# Patient Record
Sex: FEMALE | Race: WHITE | Employment: OTHER | URBAN - METROPOLITAN AREA
[De-identification: names, ages, dates, MRNs, and addresses within clinical notes are randomized per-mention and may not be internally consistent; named-entity substitution may affect disease eponyms.]

---

## 2017-01-20 ENCOUNTER — TELEPHONE (OUTPATIENT)
Dept: PULMONOLOGY | Age: 62
End: 2017-01-20

## 2017-02-21 ENCOUNTER — TELEPHONE (OUTPATIENT)
Dept: CARDIOLOGY CLINIC | Age: 62
End: 2017-02-21

## 2017-03-02 ENCOUNTER — TELEPHONE (OUTPATIENT)
Dept: CARDIOLOGY CLINIC | Age: 62
End: 2017-03-02

## 2017-03-09 ENCOUNTER — TELEPHONE (OUTPATIENT)
Dept: CARDIOLOGY CLINIC | Age: 62
End: 2017-03-09

## 2017-03-21 ENCOUNTER — TELEPHONE (OUTPATIENT)
Dept: CARDIOLOGY CLINIC | Age: 62
End: 2017-03-21

## 2017-03-22 ENCOUNTER — TELEPHONE (OUTPATIENT)
Dept: CARDIOLOGY CLINIC | Age: 62
End: 2017-03-22

## 2017-04-11 ENCOUNTER — TELEPHONE (OUTPATIENT)
Dept: CARDIOLOGY CLINIC | Age: 62
End: 2017-04-11

## 2017-11-29 ENCOUNTER — OFFICE VISIT (OUTPATIENT)
Dept: PULMONOLOGY | Age: 62
End: 2017-11-29

## 2017-11-29 VITALS — OXYGEN SATURATION: 97 % | DIASTOLIC BLOOD PRESSURE: 61 MMHG | HEART RATE: 50 BPM | SYSTOLIC BLOOD PRESSURE: 136 MMHG

## 2017-11-29 DIAGNOSIS — R05.3 CHRONIC COUGH: Primary | ICD-10-CM

## 2017-11-29 DIAGNOSIS — D86.9 SARCOID: ICD-10-CM

## 2017-11-29 DIAGNOSIS — R56.9 SEIZURE (HCC): ICD-10-CM

## 2017-11-29 PROCEDURE — G8427 DOCREV CUR MEDS BY ELIG CLIN: HCPCS | Performed by: INTERNAL MEDICINE

## 2017-11-29 PROCEDURE — 99204 OFFICE O/P NEW MOD 45 MIN: CPT | Performed by: INTERNAL MEDICINE

## 2017-11-29 PROCEDURE — G8484 FLU IMMUNIZE NO ADMIN: HCPCS | Performed by: INTERNAL MEDICINE

## 2017-11-29 PROCEDURE — 3017F COLORECTAL CA SCREEN DOC REV: CPT | Performed by: INTERNAL MEDICINE

## 2017-11-29 PROCEDURE — G8419 CALC BMI OUT NRM PARAM NOF/U: HCPCS | Performed by: INTERNAL MEDICINE

## 2017-11-29 PROCEDURE — 1036F TOBACCO NON-USER: CPT | Performed by: INTERNAL MEDICINE

## 2017-11-29 PROCEDURE — 3014F SCREEN MAMMO DOC REV: CPT | Performed by: INTERNAL MEDICINE

## 2017-11-29 RX ORDER — MECLIZINE HCL 12.5 MG/1
12.5 TABLET ORAL 3 TIMES DAILY PRN
COMMUNITY
End: 2017-12-22 | Stop reason: ALTCHOICE

## 2017-11-29 RX ORDER — FLUTICASONE PROPIONATE 220 UG/1
1 AEROSOL, METERED RESPIRATORY (INHALATION) 2 TIMES DAILY
COMMUNITY
Start: 2017-06-27

## 2017-11-29 RX ORDER — ALBUTEROL SULFATE 90 UG/1
2 AEROSOL, METERED RESPIRATORY (INHALATION) EVERY 6 HOURS PRN
Status: ON HOLD | COMMUNITY
End: 2018-07-15 | Stop reason: ALTCHOICE

## 2017-11-29 ASSESSMENT — ENCOUNTER SYMPTOMS
CONSTIPATION: 0
DIARRHEA: 0
SINUS PRESSURE: 0
NAUSEA: 0
ABDOMINAL PAIN: 0

## 2017-11-29 NOTE — PROGRESS NOTES
Subjective:      Patient ID: Mary Jane Adam is a 57 y.o. female. HPI  The patient presents with complaints of Sarcoidosis. She was diagnosed with \"neurosarcoid\" by Dr Cherry Rubi in 2002. She is fuzzy on the details. Review of Dr Ninoska Moser last note (2014) states: \"possible neurosarcoidosis based on the clinical features and an elevated ACE level. \"    Dr Cherry Rubi concludes: \"My impression is that Ms. Sarah Will does seem to have some autonomic problems and I think she very likely has small fiber neuropathy. She was going to see Dr. Carlee Dhillon but insurance issues became more _____ problematic. She also has difficulty arranging for transportation and someone to be with her for 2 or 3 days that she would have to have when she was being seen at the [facility]. She is on Cymbalta now. She did feel better when she was on the drug, worse when she came off. She has not been able to tolerate Neurontin or Lyrica. I thought we would see if we can add on low-dose amitriptyline at 10 mg at night. She will contact us if she has problems. I am very reluctant to start her on prednisone. I think her symptoms are more related to the small fiber neuropathy which usually does not respond to steroids. She has no specific inflammatory component at this time that requires specific therapy. I do think she is having problems with possible drug interactions. She is having memory problems. She has not been able to work now because of all her medications. She will follow up with her primary doctors regarding this. \"      She gets sick really easy and it takes a long time to get better. She had some hemoptysis in October. She has heart problems that she blames on Sarcoid as well. She has had a lot of trouble with cough (chronic). She has had seizures and \"stroke\". She was on Keppra for a time. She was in a nursing home for a time. Review of Systems   Constitutional: Negative for fatigue and fever.    HENT: Negative for congestion and sinus pressure. Eyes: Negative for visual disturbance. Cardiovascular: Negative for chest pain and palpitations. Gastrointestinal: Negative for abdominal pain, constipation, diarrhea and nausea. Some trouble with swallowing   Genitourinary: Negative for difficulty urinating. Musculoskeletal: Negative for arthralgias. Skin: Negative for rash. Neurological: Negative for dizziness and light-headedness. Hematological: Does not bruise/bleed easily. Psychiatric/Behavioral: Negative for behavioral problems. Objective:   Physical Exam   Constitutional: She appears well-developed and well-nourished. No distress. HENT:   Head: Normocephalic and atraumatic. Eyes:   Nasal mucosa, teeth and gums and oropharynx are clear. Neck: Neck supple. No JVD present. No tracheal deviation present. No thyromegaly (There are no other neck masses noted.) present. Cardiovascular: Normal rate, regular rhythm, normal heart sounds and intact distal pulses. No murmur heard. Pulmonary/Chest: Effort normal and breath sounds normal. No respiratory distress. She has no wheezes. She has no rales. She exhibits no tenderness. The chest is symmetric in. There is no dullness to percussion or tactile fremitus noted. Abdominal: Soft. Bowel sounds are normal. She exhibits no distension. Mass: There is no hepatosplenomegaly. There is no tenderness. Musculoskeletal: She exhibits no edema (There is no clubbingor cyanosis of the digits) or tenderness (Muscle strength is normal and there is no obvious atrophy). Lymphadenopathy:     She has no cervical adenopathy. She has no axillary adenopathy. Right: No inguinal adenopathy present. Left: No inguinal adenopathy present. Skin: Skin is warm and dry. No rash noted. Psychiatric: She has a normal mood and affect. Oriented to person place and time       Assessment:      1. Seizure ([address]     2. Chronic cough     3.  Sarcoid ([address]

## 2018-02-13 ENCOUNTER — TELEPHONE (OUTPATIENT)
Dept: INTERNAL MEDICINE CLINIC | Age: 63
End: 2018-02-13

## 2018-06-05 PROBLEM — I63.511 ACUTE RIGHT MCA STROKE (HCC): Status: ACTIVE | Noted: 2018-06-05

## 2018-06-06 PROBLEM — I48.0 PAF (PAROXYSMAL ATRIAL FIBRILLATION) (HCC): Status: ACTIVE | Noted: 2018-06-06

## 2018-06-09 PROBLEM — I48.0 PAF (PAROXYSMAL ATRIAL FIBRILLATION) (HCC): Status: RESOLVED | Noted: 2018-06-06 | Resolved: 2018-06-09

## 2018-06-09 PROBLEM — I63.511 ACUTE RIGHT MCA STROKE (HCC): Status: RESOLVED | Noted: 2018-06-05 | Resolved: 2018-06-09

## 2018-07-15 ENCOUNTER — HOSPITAL ENCOUNTER (INPATIENT)
Age: 63
LOS: 1 days | Discharge: HOME | DRG: 313 | End: 2018-07-16
Attending: EMERGENCY MEDICINE | Admitting: HOSPITALIST
Payer: MEDICARE

## 2018-07-15 ENCOUNTER — APPOINTMENT (OUTPATIENT)
Dept: GENERAL RADIOLOGY | Age: 63
DRG: 313 | End: 2018-07-15
Payer: MEDICARE

## 2018-07-15 DIAGNOSIS — R07.9 CHEST PAIN, UNSPECIFIED TYPE: Primary | ICD-10-CM

## 2018-07-15 LAB
BASOPHILS ABSOLUTE: 0 K/UL (ref 0–0.2)
BASOPHILS RELATIVE PERCENT: 0.8 %
BILIRUBIN URINE: NEGATIVE MG/DL
BLOOD, URINE: NEGATIVE
CALCIUM IONIZED: 1.13 MMOL/L (ref 1.12–1.32)
CLARITY: ABNORMAL
CO2: 24 MMOL/L (ref 21–32)
COLOR: ABNORMAL
EOSINOPHILS ABSOLUTE: 0.1 K/UL (ref 0–0.6)
EOSINOPHILS RELATIVE PERCENT: 1.2 %
EPITHELIAL CELLS, UA: NORMAL /HPF
GFR AFRICAN AMERICAN: >60
GFR NON-AFRICAN AMERICAN: >60
GLUCOSE BLD-MCNC: 95 MG/DL (ref 70–99)
GLUCOSE URINE: NEGATIVE MG/DL
HCT VFR BLD CALC: 40.6 % (ref 36–48)
HEMOGLOBIN: 13.7 G/DL (ref 12–16)
KETONES, URINE: NEGATIVE MG/DL
LEUKOCYTE ESTERASE, URINE: ABNORMAL
LYMPHOCYTES ABSOLUTE: 1.8 K/UL (ref 1–5.1)
LYMPHOCYTES RELATIVE PERCENT: 32.9 %
MCH RBC QN AUTO: 29.7 PG (ref 26–34)
MCHC RBC AUTO-ENTMCNC: 33.7 G/DL (ref 31–36)
MCV RBC AUTO: 88 FL (ref 80–100)
MICROSCOPIC EXAMINATION: YES
MONOCYTES ABSOLUTE: 0.4 K/UL (ref 0–1.3)
MONOCYTES RELATIVE PERCENT: 8.3 %
NEUTROPHILS ABSOLUTE: 3.1 K/UL (ref 1.7–7.7)
NEUTROPHILS RELATIVE PERCENT: 56.8 %
NITRITE, URINE: NEGATIVE
PDW BLD-RTO: 13.5 % (ref 12.4–15.4)
PERFORMED ON: ABNORMAL
PERFORMED ON: NORMAL
PH UA: 6.5
PLATELET # BLD: 240 K/UL (ref 135–450)
PMV BLD AUTO: 7.9 FL (ref 5–10.5)
POC ANION GAP: 13 (ref 10–20)
POC BUN: 17 MG/DL (ref 7–18)
POC CHLORIDE: 104 MMOL/L (ref 99–110)
POC CREATININE: 0.8 MG/DL (ref 0.6–1.2)
POC POTASSIUM: 3.9 MMOL/L (ref 3.5–5.1)
POC SAMPLE TYPE: ABNORMAL
POC SAMPLE TYPE: NORMAL
POC SODIUM: 141 MMOL/L (ref 136–145)
POC TROPONIN I: 0.11 NG/ML (ref 0–0.1)
PROTEIN UA: NEGATIVE MG/DL
RBC # BLD: 4.61 M/UL (ref 4–5.2)
RBC UA: NORMAL /HPF (ref 0–2)
SPECIFIC GRAVITY UA: 1.01
TROPONIN: 0.03 NG/ML
UROBILINOGEN, URINE: 0.2 E.U./DL
WBC # BLD: 5.4 K/UL (ref 4–11)
WBC UA: NORMAL /HPF (ref 0–5)

## 2018-07-15 PROCEDURE — G0378 HOSPITAL OBSERVATION PER HR: HCPCS

## 2018-07-15 PROCEDURE — 2060000000 HC ICU INTERMEDIATE R&B

## 2018-07-15 PROCEDURE — 85025 COMPLETE CBC W/AUTO DIFF WBC: CPT

## 2018-07-15 PROCEDURE — 84484 ASSAY OF TROPONIN QUANT: CPT

## 2018-07-15 PROCEDURE — 80047 BASIC METABLC PNL IONIZED CA: CPT

## 2018-07-15 PROCEDURE — 6370000000 HC RX 637 (ALT 250 FOR IP): Performed by: EMERGENCY MEDICINE

## 2018-07-15 PROCEDURE — 93005 ELECTROCARDIOGRAM TRACING: CPT

## 2018-07-15 PROCEDURE — 81001 URINALYSIS AUTO W/SCOPE: CPT

## 2018-07-15 PROCEDURE — 71045 X-RAY EXAM CHEST 1 VIEW: CPT

## 2018-07-15 PROCEDURE — 99285 EMERGENCY DEPT VISIT HI MDM: CPT

## 2018-07-15 RX ORDER — LEVOTHYROXINE SODIUM 0.07 MG/1
75 TABLET ORAL DAILY
Status: DISCONTINUED | OUTPATIENT
Start: 2018-07-16 | End: 2018-07-16 | Stop reason: HOSPADM

## 2018-07-15 RX ORDER — SODIUM CHLORIDE 0.9 % (FLUSH) 0.9 %
10 SYRINGE (ML) INJECTION EVERY 12 HOURS SCHEDULED
Status: DISCONTINUED | OUTPATIENT
Start: 2018-07-15 | End: 2018-07-16 | Stop reason: HOSPADM

## 2018-07-15 RX ORDER — ALBUTEROL SULFATE 90 UG/1
2 AEROSOL, METERED RESPIRATORY (INHALATION) EVERY 6 HOURS PRN
Status: DISCONTINUED | OUTPATIENT
Start: 2018-07-15 | End: 2018-07-16 | Stop reason: HOSPADM

## 2018-07-15 RX ORDER — FLUTICASONE PROPIONATE 50 MCG
1 SPRAY, SUSPENSION (ML) NASAL DAILY
Status: DISCONTINUED | OUTPATIENT
Start: 2018-07-16 | End: 2018-07-16 | Stop reason: HOSPADM

## 2018-07-15 RX ORDER — SODIUM CHLORIDE 0.9 % (FLUSH) 0.9 %
10 SYRINGE (ML) INJECTION PRN
Status: DISCONTINUED | OUTPATIENT
Start: 2018-07-15 | End: 2018-07-16 | Stop reason: HOSPADM

## 2018-07-15 RX ORDER — ASPIRIN 81 MG/1
81 TABLET ORAL DAILY
Status: DISCONTINUED | OUTPATIENT
Start: 2018-07-16 | End: 2018-07-16 | Stop reason: HOSPADM

## 2018-07-15 RX ORDER — ASPIRIN 81 MG/1
324 TABLET, CHEWABLE ORAL ONCE
Status: DISCONTINUED | OUTPATIENT
Start: 2018-07-15 | End: 2018-07-15

## 2018-07-15 RX ORDER — DIAZEPAM 5 MG/1
5 TABLET ORAL EVERY 12 HOURS PRN
Status: DISCONTINUED | OUTPATIENT
Start: 2018-07-15 | End: 2018-07-16 | Stop reason: HOSPADM

## 2018-07-15 RX ORDER — ESCITALOPRAM OXALATE 10 MG/1
5 TABLET ORAL NIGHTLY
Status: DISCONTINUED | OUTPATIENT
Start: 2018-07-15 | End: 2018-07-16 | Stop reason: HOSPADM

## 2018-07-15 RX ORDER — PANTOPRAZOLE SODIUM 20 MG/1
20 TABLET, DELAYED RELEASE ORAL 2 TIMES DAILY
Status: DISCONTINUED | OUTPATIENT
Start: 2018-07-15 | End: 2018-07-16 | Stop reason: HOSPADM

## 2018-07-15 RX ORDER — UBIDECARENONE 75 MG
50 CAPSULE ORAL DAILY
Status: DISCONTINUED | OUTPATIENT
Start: 2018-07-16 | End: 2018-07-16 | Stop reason: HOSPADM

## 2018-07-15 RX ORDER — ONDANSETRON 2 MG/ML
4 INJECTION INTRAMUSCULAR; INTRAVENOUS EVERY 6 HOURS PRN
Status: DISCONTINUED | OUTPATIENT
Start: 2018-07-15 | End: 2018-07-16 | Stop reason: HOSPADM

## 2018-07-15 RX ORDER — ASPIRIN 325 MG
325 TABLET ORAL ONCE
Status: COMPLETED | OUTPATIENT
Start: 2018-07-15 | End: 2018-07-15

## 2018-07-15 RX ORDER — ACETAMINOPHEN 325 MG/1
650 TABLET ORAL EVERY 4 HOURS PRN
Status: DISCONTINUED | OUTPATIENT
Start: 2018-07-15 | End: 2018-07-16 | Stop reason: HOSPADM

## 2018-07-15 RX ADMIN — ASPIRIN 325 MG ORAL TABLET 325 MG: 325 PILL ORAL at 18:09

## 2018-07-15 ASSESSMENT — PAIN DESCRIPTION - PAIN TYPE
TYPE: ACUTE PAIN
TYPE: ACUTE PAIN

## 2018-07-15 ASSESSMENT — PAIN SCALES - GENERAL
PAINLEVEL_OUTOF10: 5
PAINLEVEL_OUTOF10: 4

## 2018-07-15 ASSESSMENT — PAIN DESCRIPTION - LOCATION
LOCATION: HEAD
LOCATION: HIP;LEG

## 2018-07-15 ASSESSMENT — PAIN DESCRIPTION - FREQUENCY: FREQUENCY: CONTINUOUS

## 2018-07-15 ASSESSMENT — PAIN DESCRIPTION - ONSET: ONSET: PROGRESSIVE

## 2018-07-15 ASSESSMENT — PAIN DESCRIPTION - DESCRIPTORS: DESCRIPTORS: HEADACHE

## 2018-07-15 ASSESSMENT — PAIN DESCRIPTION - PROGRESSION: CLINICAL_PROGRESSION: NOT CHANGED

## 2018-07-15 ASSESSMENT — PAIN DESCRIPTION - ORIENTATION
ORIENTATION: RIGHT;LEFT
ORIENTATION: MID

## 2018-07-15 NOTE — ED PROVIDER NOTES
[facility] RESIDENT NOTE     Date of evaluation: 7/15/2018    ADDENDUM:      Care of this patient was assumed from Dr. Marcelle Swenson. The patient was seen for Chest Pain (Pt walked to fire department d/t chest pain and felt like her a-fib was acting up. When she arrived to the fire department she had three witnessed seizures. Pt has hx seizure but no on medication. ) and Seizures  . The patient's initial evaluation and plan have been discussed with the prior provider who initially evaluated the patient. Nursing Notes, Past Medical Hx, Past Surgical Hx, Social Hx, Allergies, and Family Hx were all reviewed. Diagnostic Results       RADIOLOGY:  XR CHEST PORTABLE   Final Result      No acute pulmonary disease.           LABS:   Results for orders placed or performed during the hospital encounter of 07/15/18   CBC auto differential   Result Value Ref Range    WBC 5.4 4.0 - 11.0 K/uL    RBC 4.61 4.00 - 5.20 M/uL    Hemoglobin 13.7 12.0 - 16.0 g/dL    Hematocrit 40.6 36.0 - 48.0 %    MCV 88.0 80.0 - 100.0 fL    MCH 29.7 26.0 - 34.0 pg    MCHC 33.7 31.0 - 36.0 g/dL    RDW 13.5 12.4 - 15.4 %    Platelets 773 508 - 193 K/uL    MPV 7.9 5.0 - 10.5 fL    Neutrophils % 56.8 %    Lymphocytes % 32.9 %    Monocytes % 8.3 %    Eosinophils % 1.2 %    Basophils % 0.8 %    Neutrophils # 3.1 1.7 - 7.7 K/uL    Lymphocytes # 1.8 1.0 - 5.1 K/uL    Monocytes # 0.4 0.0 - 1.3 K/uL    Eosinophils # 0.1 0.0 - 0.6 K/uL    Basophils # 0.0 0.0 - 0.2 K/uL   Troponin   Result Value Ref Range    Troponin 0.03 (H) <0.01 ng/mL   Microscopic Urinalysis   Result Value Ref Range    WBC, UA 0-2 0 - 5 /HPF    RBC, UA 0-2 0 - 2 /HPF    Epi Cells 0-2 /HPF   POCT Venous   Result Value Ref Range    POC Sodium 141 136 - 145 mmol/L    POC Potassium 3.9 3.5 - 5.1 mmol/L    POC Chloride 104 99 - 110 mmol/L    CO2 24 21 - 32 mmol/L    POC Anion Gap 13 10 - 20    POC Glucose 95 70 - 99 mg/dl    POC BUN 17 7 - 18 mg/dL 1. Chest pain, unspecified type        Disposition     PATIENT REFERRED TO:  Coni [address]  [phone]            DISCHARGE MEDICATIONS:  New Prescriptions    No medications on file       DISPOSITION Admitted 07/15/2018 07:52:59 PM        Anayeli Sheldon MD  Resident  07/15/18 2007

## 2018-07-15 NOTE — ED PROVIDER NOTES
Date of evaluation: 7/15/2018    Chief Complaint   Chest Pain (Pt walked to fire department d/t chest pain and felt like her a-fib was acting up. When she arrived to the fire department she had three witnessed seizures. Pt has hx seizure but no on medication. ) and Seizures    Nursing Notes, Past Medical Hx, Past Surgical Hx, Social Hx, Allergies, and Family Hx were reviewed. History of Present Illness     Kingston Cuellar is a 70 y.o. female with a history as noted below presenting with chest pain and seizures. The patient has a recent hospitalization last month due to acute left-sided weakness, she tells me that her doctor during that admission, Dr. Bk Posadas, told her she had a \"devastating stroke. \"  Reading his discharge summary from that hospital stay, she had an extensive evaluation including CT, MRI, MRA, lumbar puncture, all were within normal limits, they could not explain her symptoms, she had no evidence of atrial fibrillation, this was investigated, no history of this was ever found, psychiatry was consulted, they believe she has conversion disorder. She presented to emergency department today via EMS. History is obtained from her as well as from EMS. She tells me that yesterday, she felt very fatigued and globally weak. This was worsened today and she began feeling today some \"irregular beats\" that was her atrial fibrillation and each irregular beat was causing her chest pain. She therefore ambulated to the fire department where she reportedly had 3 seizures. She was treated with 10 mg of intramuscular Versed. She was subsequently brought here. On arrival, no postictal state. She has mild chest pain at this time. No difficulty breathing. Denies any recent illness. No fevers, pain with urination, abdominal pain, nausea, vomiting, diarrhea. With the exception of the above, there are no further alleviating or exacerbating factors, and no other associated symptoms.       Review of Systems The patient reports Chest pain, irregular heartbeat, generalized fatigue as above. Denies fevers, chills, headache, neck pain, difficulty breathing, abdominal pain, vomiting, diarrhea, rectal bleeding, dysuria. All other review of systems reviewed and were negative except as mentioned in HPI. Past Medical, Surgical, Family, and Social History         Diagnosis Date    Arrhythmia     Asthma     Atrial fibrillation ([address]     CAD (coronary artery disease)     Gastroparesis     Hypertension     PAF (paroxysmal atrial fibrillation) ([address] 6/6/2018    Patient reported; no concrete evidence    Psychiatric disorder     Seizures ([address]     Sick sinus syndrome ([address]     T[facility]-jazmin syndrome [facility]     Unspecified cerebral artery occlusion with cerebral infarction          Procedure Laterality Date    APPENDECTOMY      BREAST SURGERY      HYSTERECTOMY       Her family history includes Alzheimer's Disease in her mother; Diabetes in her father and mother; High Blood Pressure in her mother. She reports that she has never smoked. She has never used smokeless tobacco. She reports that she does not drink alcohol or use drugs.     Medications     Previous Medications    ALBUTEROL SULFATE  (90 BASE) MCG/ACT INHALER    Inhale 2 puffs into the lungs every 6 hours as needed    ASPIRIN EC 81 MG EC TABLET    Take 81 mg by mouth daily    CYANOCOBALAMIN (B-12 PO)    Take 1 tablet by mouth daily    DIAZEPAM (VALIUM) 5 MG TABLET    Take 5 mg by mouth every 12 hours as needed     ESCITALOPRAM (LEXAPRO) 10 MG TABLET    Take 5 mg by mouth nightly     FLUTICASONE (FLONASE) 50 MCG/ACT NASAL SPRAY    1 spray by Nasal route daily    FLUTICASONE (FLOVENT HFA) 220 MCG/ACT INHALER    Inhale 1 puff into the lungs 2 times daily    LEVOTHYROXINE (SYNTHROID) 75 MCG TABLET    Take 75 mcg by mouth Daily    PANTOPRAZOLE (PROTONIX) 20 MG TABLET    Take 1 tablet by mouth 2 times daily    VITAMIN B-12 (CYANOCOBALAMIN) 100 MCG TABLET encounter. CONSULTS:  None    PATIENT REFERRED TO:  No follow-up provider specified.     DISCHARGE MEDICATIONS:  New Prescriptions    No medications on file        Rajendra Lee MD  Resident  07/19/18 [id number]

## 2018-07-16 VITALS
DIASTOLIC BLOOD PRESSURE: 70 MMHG | RESPIRATION RATE: 16 BRPM | WEIGHT: 167.33 LBS | TEMPERATURE: 97.8 F | BODY MASS INDEX: 27.88 KG/M2 | HEIGHT: 65 IN | OXYGEN SATURATION: 98 % | SYSTOLIC BLOOD PRESSURE: 112 MMHG | HEART RATE: 48 BPM

## 2018-07-16 PROBLEM — R56.9 SEIZURE-LIKE ACTIVITY (HCC): Status: ACTIVE | Noted: 2018-07-16

## 2018-07-16 PROBLEM — R77.8 ELEVATED TROPONIN I LEVEL: Status: ACTIVE | Noted: 2018-07-16

## 2018-07-16 PROBLEM — R79.89 ELEVATED TROPONIN I LEVEL: Status: ACTIVE | Noted: 2018-07-16

## 2018-07-16 LAB
ANION GAP SERPL CALCULATED.3IONS-SCNC: 14 MMOL/L (ref 3–16)
BUN BLDV-MCNC: 15 MG/DL (ref 7–20)
CALCIUM SERPL-MCNC: 9 MG/DL (ref 8.3–10.6)
CHLORIDE BLD-SCNC: 106 MMOL/L (ref 99–110)
CHOLESTEROL, TOTAL: 179 MG/DL (ref 0–199)
CO2: 23 MMOL/L (ref 21–32)
CREAT SERPL-MCNC: 0.8 MG/DL (ref 0.6–1.2)
GFR AFRICAN AMERICAN: >60
GFR NON-AFRICAN AMERICAN: >60
GLUCOSE BLD-MCNC: 106 MG/DL (ref 70–99)
HCT VFR BLD CALC: 40.2 % (ref 36–48)
HDLC SERPL-MCNC: 32 MG/DL (ref 40–60)
HEMOGLOBIN: 13.3 G/DL (ref 12–16)
LDL CHOLESTEROL CALCULATED: 113 MG/DL
MAGNESIUM: 2.1 MG/DL (ref 1.8–2.4)
MCH RBC QN AUTO: 29.6 PG (ref 26–34)
MCHC RBC AUTO-ENTMCNC: 33.2 G/DL (ref 31–36)
MCV RBC AUTO: 89.1 FL (ref 80–100)
PDW BLD-RTO: 13.4 % (ref 12.4–15.4)
PLATELET # BLD: 236 K/UL (ref 135–450)
PMV BLD AUTO: 8.2 FL (ref 5–10.5)
POTASSIUM REFLEX MAGNESIUM: 4.3 MMOL/L (ref 3.5–5.1)
RBC # BLD: 4.51 M/UL (ref 4–5.2)
SODIUM BLD-SCNC: 143 MMOL/L (ref 136–145)
TRIGL SERPL-MCNC: 172 MG/DL (ref 0–150)
TROPONIN: 0.02 NG/ML
TROPONIN: 0.02 NG/ML
TSH REFLEX: 1.21 UIU/ML (ref 0.27–4.2)
VLDLC SERPL CALC-MCNC: 34 MG/DL
WBC # BLD: 6.1 K/UL (ref 4–11)

## 2018-07-16 PROCEDURE — 6360000002 HC RX W HCPCS: Performed by: HOSPITALIST

## 2018-07-16 PROCEDURE — 2580000003 HC RX 258: Performed by: HOSPITALIST

## 2018-07-16 PROCEDURE — 36415 COLL VENOUS BLD VENIPUNCTURE: CPT

## 2018-07-16 PROCEDURE — 84443 ASSAY THYROID STIM HORMONE: CPT

## 2018-07-16 PROCEDURE — 94664 DEMO&/EVAL PT USE INHALER: CPT

## 2018-07-16 PROCEDURE — 94761 N-INVAS EAR/PLS OXIMETRY MLT: CPT

## 2018-07-16 PROCEDURE — 80048 BASIC METABOLIC PNL TOTAL CA: CPT

## 2018-07-16 PROCEDURE — 80061 LIPID PANEL: CPT

## 2018-07-16 PROCEDURE — G0378 HOSPITAL OBSERVATION PER HR: HCPCS

## 2018-07-16 PROCEDURE — 6370000000 HC RX 637 (ALT 250 FOR IP): Performed by: HOSPITALIST

## 2018-07-16 PROCEDURE — 83735 ASSAY OF MAGNESIUM: CPT

## 2018-07-16 PROCEDURE — 96372 THER/PROPH/DIAG INJ SC/IM: CPT

## 2018-07-16 PROCEDURE — 85027 COMPLETE CBC AUTOMATED: CPT

## 2018-07-16 PROCEDURE — 94760 N-INVAS EAR/PLS OXIMETRY 1: CPT

## 2018-07-16 PROCEDURE — 84484 ASSAY OF TROPONIN QUANT: CPT

## 2018-07-16 PROCEDURE — 94640 AIRWAY INHALATION TREATMENT: CPT

## 2018-07-16 RX ORDER — ACETAMINOPHEN 325 MG/1
650 TABLET ORAL EVERY 6 HOURS PRN
Qty: 120 TABLET | Refills: 3 | COMMUNITY
Start: 2018-07-16

## 2018-07-16 RX ADMIN — VITAMIN D, TAB 1000IU (100/BT) 1000 UNITS: 25 TAB at 07:52

## 2018-07-16 RX ADMIN — ASPIRIN 81 MG: 81 TABLET, COATED ORAL at 07:52

## 2018-07-16 RX ADMIN — Medication 2 PUFF: at 09:20

## 2018-07-16 RX ADMIN — LEVOTHYROXINE SODIUM 75 MCG: 75 TABLET ORAL at 07:52

## 2018-07-16 RX ADMIN — PANTOPRAZOLE SODIUM 20 MG: 20 TABLET, DELAYED RELEASE ORAL at 07:53

## 2018-07-16 RX ADMIN — Medication 10 ML: at 07:54

## 2018-07-16 RX ADMIN — VITAM B12 50 MCG: 100 TAB at 07:52

## 2018-07-16 RX ADMIN — ESCITALOPRAM OXALATE 5 MG: 10 TABLET ORAL at 00:58

## 2018-07-16 RX ADMIN — FLUTICASONE PROPIONATE 1 SPRAY: 50 SPRAY, METERED NASAL at 11:20

## 2018-07-16 RX ADMIN — DIAZEPAM 5 MG: 5 TABLET ORAL at 12:45

## 2018-07-16 RX ADMIN — ACETAMINOPHEN 650 MG: 325 TABLET ORAL at 00:59

## 2018-07-16 RX ADMIN — DIAZEPAM 5 MG: 5 TABLET ORAL at 03:40

## 2018-07-16 RX ADMIN — ENOXAPARIN SODIUM 40 MG: 100 INJECTION SUBCUTANEOUS at 07:52

## 2018-07-16 RX ADMIN — PANTOPRAZOLE SODIUM 20 MG: 20 TABLET, DELAYED RELEASE ORAL at 00:59

## 2018-07-16 ASSESSMENT — PAIN SCALES - GENERAL
PAINLEVEL_OUTOF10: 0
PAINLEVEL_OUTOF10: 0
PAINLEVEL_OUTOF10: 4
PAINLEVEL_OUTOF10: 0
PAINLEVEL_OUTOF10: 0

## 2018-07-16 ASSESSMENT — PAIN DESCRIPTION - ONSET: ONSET: ON-GOING

## 2018-07-16 ASSESSMENT — PAIN DESCRIPTION - DESCRIPTORS: DESCRIPTORS: HEADACHE

## 2018-07-16 ASSESSMENT — PAIN DESCRIPTION - LOCATION: LOCATION: HEAD

## 2018-07-16 ASSESSMENT — PAIN DESCRIPTION - PAIN TYPE: TYPE: ACUTE PAIN

## 2018-07-16 ASSESSMENT — PAIN DESCRIPTION - PROGRESSION
CLINICAL_PROGRESSION: NOT CHANGED
CLINICAL_PROGRESSION: NOT CHANGED

## 2018-07-16 ASSESSMENT — PAIN DESCRIPTION - FREQUENCY: FREQUENCY: CONTINUOUS

## 2018-07-16 ASSESSMENT — PAIN DESCRIPTION - ORIENTATION: ORIENTATION: MID

## 2018-07-16 NOTE — CONSULTS
--   143   K   --   4.3   CL   --   106   CO2  24  23   BUN   --   15   CREATININE  0.8  0.8     Mag:   Lab Results   Component Value Date    MG 2.10 07/16/2018     CARDIAC ENZYMES:   Recent Labs      07/15/18   1851  07/16/18   0011  07/16/18   0259   TROPONINI  0.03*  0.02*  0.02*     CXR: Normal      2014 LHC:  Cardiac Angiography: 2014 - Dr. LYN[facility]  Additional comments:   Left ventriculography and Hemodynamics   LVEDP 5   Estimated Ejection Fraction   Wall motion 65% with normal regional wall motion   Valve function No gradient across the Av and no MR   Coronary angiography   Dominance RCA   Left Main 0%   Left Anterior Descending 0% in the proximal and mid and distal segment   Left Circumflex 0% stenosis in the proximal and mid and distal segment , two small OM normal   Right Proximal , mid and distal And 0% PDA and PLV stenosis      6/2018 Echo: EF normal. Mod TR with rvsp of 44. No shunt on bubble study    ________________________    Epic chart reviewed. Patient has been discharged after apparently refusing any workup. I went to see the patient (~1245) and she was getting changed. RN informed me of her discharge. Please call me back if she is agreeable to an exam/evaluation. Liset Palomo MD, [facility]

## 2018-07-16 NOTE — H&P
Hospital Medicine History & Physical      PCP: Agata Suárez    Date of Admission: 7/15/2018    Date of Service: Pt seen/examined on 7/15/18 and Admitted to Inpatient with expected LOS greater than two midnights due to medical therapy    Chief Complaint:  Chest discomofrt    History Of Present Illness:    70 y.o. female who presented to [facility] sec to onset of chest discomfort. Was well until earlier today when noticed onset of feeling of palpitations mid chest, did not go away. She thus drove to the local fire dept to get checked out. While there, states she had a seizure, awoke in ambulance and brought to [facility] ED. Currently denies palpitations, CP. Extensive hx of CP, seizures, bradycardia of unclear etiology. Has seen multiple physicians in the area for this. Had EP study in past which was neg. Appears 3 EP physicians have declined the idea of pacemaker placement. Had L and R heart cath approx 4 yrs ago per pt, completely normal coronaries per pt. She holds a possible dx of neurosarcoidosis and seizure disorder. She was admitted here in June 18' for possible stroke, had extensive workup which was neg, psych felt possible conversion disorder. Past Medical History:          Diagnosis Date    Arrhythmia     Asthma     Atrial fibrillation ([address]     CAD (coronary artery disease)     Gastroparesis     Hypertension     PAF (paroxysmal atrial fibrillation) ([address] 6/6/2018    Patient reported; no concrete evidence    Psychiatric disorder     Seizures ([address]     Sick sinus syndrome ([address]     Tachy-jazmin syndrome ([address]     Unspecified cerebral artery occlusion with cerebral infarction        Past Surgical History:          Procedure Laterality Date    APPENDECTOMY      BREAST SURGERY      HYSTERECTOMY         Medications Prior to Admission:      Prior to Admission medications    Medication Sig Start Date End Date Taking?  Authorizing Provider   fluticasone (FLONASE) 50 MCG/ACT nasal 97.1 °F (36.2 °C) (Oral)   Resp 16   Ht 5' 5\" (1.651 m)   Wt 167 lb 5.3 oz (75.9 kg)   SpO2 95%   BMI 27.85 kg/m²     General appearance:  No apparent distress, appears stated age and cooperative. HEENT:  Normal cephalic, atraumatic without obvious deformity. Pupils equal, round, and reactive to light. Extra ocular muscles intact. Conjunctivae/corneas clear. Neck: Supple, with full range of motion. No jugular venous distention. Trachea midline. Respiratory:  Normal respiratory effort. Clear to auscultation, bilaterally without Rales/Wheezes/Rhonchi. Cardiovascular:  Regular rate and rhythm with normal S1/S2 without murmurs, rubs or gallops. Abdomen: Soft, non-tender, non-distended with normal bowel sounds. Musculoskeletal:  No clubbing, cyanosis or edema bilaterally. Full range of motion without deformity. Skin: Skin color, texture, turgor normal.  No rashes or lesions. Neurologic:  Neurovascularly intact without any focal sensory/motor deficits. Psychiatric:  Alert and oriented, thought content appropriate, normal insight  Capillary Refill: Brisk,< 3 seconds   Peripheral Pulses: +2 palpable, equal bilaterally       Labs:     Recent Labs      07/15/18   1701   WBC  5.4   HGB  13.7   HCT  40.6   PLT  240     Recent Labs      07/15/18   1659   CO2  24   CREATININE  0.8     No results for input(s): AST, ALT, BILIDIR, BILITOT, ALKPHOS in the last 72 hours. No results for input(s): INR in the last 72 hours.   Recent Labs      07/15/18   1851  07/16/18   0011  07/16/18   0259   TROPONINI  0.03*  0.02*  0.02*       Urinalysis:      Lab Results   Component Value Date    NITRU Negative 07/15/2018    WBCUA 0-2 07/15/2018    BACTERIA Rare 10/04/2016    RBCUA 0-2 07/15/2018    BLOODU Negative 07/15/2018    SPECGRAV 1.010 07/15/2018    GLUCOSEU Negative 07/15/2018    GLUCOSEU Negative 07/20/2011       Radiology:     CXR: I have reviewed the CXR with the following interpretation: NAD  EKG:  I have reviewed the EKG with the following interpretation: Sinus bradycardia    XR CHEST PORTABLE   Final Result      No acute pulmonary disease. ASSESSMENT:    Active Hospital Problems    Diagnosis Date Noted    Seizure-like activity ([facility] [R56.9] 07/16/2018    Elevated troponin I level [R74.8] 07/16/2018    HTN (hypertension) [I10] 10/05/2016    Sinus bradycardia [R00.1] 06/29/2016    Chest pain [R07.9]      PLAN:    Admit to observation, with tele    Serial trop I, mild elev thus far, possibly due to cardiac process though suspect seizure may be cause  TTE performed recently and neg, hold on repeating for now  Cont to monitor HR on tele    Will ask Cards to see pt, keep NPO at midnight if felt stress testing or LHC warranted. Could consider coronary CT scan in lieu of these, given prior LHC completely nml coronaries    DVT Prophylaxis: Lovenox  Diet: Diet NPO, After Midnight  Code Status: Full Code    PT/OT Eval Status: None    Dispo - Home late Monday    Discussed with pt, RN, detailed care everywhere review       Janice Hennessy MD    Thank you Sudhir Barragan for the opportunity to be involved in this patient's care. If you have any questions or concerns please feel free to contact me at [phone].

## 2018-07-16 NOTE — PROGRESS NOTES
4 Eyes Admission Assessment     I agree as the admission nurse that 2 RN's have performed a thorough Head to Toe Skin Assessment on the patient. ALL assessment sites listed below have been assessed on admission. Areas assessed by both nurses:   [x]   Head, Face, and Ears   [x]   Shoulders, Back, and Chest  [x]   Arms, Elbows, and Hands   [x]   Coccyx, Sacrum, and Ischum  [x]   Legs, Feet, and Heels        Does the Patient have Skin Breakdown?   No         Jorge Prevention initiated: No   Wound Care Orders initiated:  No      [facility] nurse consulted for Pressure Injury (Stage 3,4, Unstageable, DTI, NWPT, and Complex wounds):  No      Nurse 1 eSignature: Electronically signed by Elin Medley RN on 7/16/18 at 1:48 AM    Nurse 2 eSignature: Electronically signed by Gayle Michelle RN on 7/16/2018 at 3:16 AM
Patient had decided she will not be doing the ordered Lexiscan Stress Test. Pt sated she has had several in the past and nothing is ever revealed by the testing. Consulted cardiologist notified and awaiting response.  Electronically signed by Lidia Bryan RN on 7/16/2018 at 11:30 AM
CREATININE  0.8  0.8   GLUCOSE   --   106*     Hepatic: No results for input(s): AST, ALT, ALB, BILITOT, ALKPHOS in the last 72 hours. Trop 0.03, 0.02      HDL 32  TSH 1.21    CXR no acute    Assessment & Plan:   Principal Problem:    Chest pain  Active Problems:    Sinus bradycardia    HTN (hypertension)    Seizure-like activity (HCC)     Elevated troponin I level     Cardiology consulted. Possible NM stress today. Await cardiology recommendations.       Diet: Diet NPO, After Midnight  Diet NPO Time Specified  Code:Full Code  DVT PPX Lovenox    Roula Moran MD   7/16/2018 10:18 AM
Smoking History, Greater than 15 pack year = 2    Social History  Social History   Substance Use Topics    Smoking status: Never Smoker    Smokeless tobacco: Never Used    Alcohol use No       Recent Surgical History: None = 0  Past Surgical History  Past Surgical History:   Procedure Laterality Date    APPENDECTOMY      BREAST SURGERY      HYSTERECTOMY         Level of Consciousness: Alert, Oriented, and Cooperative = 0    Level of Activity: Walking unassisted = 0    Respiratory Pattern: Increased; RR 21-30 = 1    Breath Sounds: Clear = 0    Sputum   ,  ,    Cough: Strong, spontaneous, non-productive = 0    Vital Signs   BP (!) 123/54   Pulse 53   Temp 97.9 °F (36.6 °C) (Oral)   Resp 18   Ht 5' 5\" (1.651 m)   Wt 167 lb 5.3 oz (75.9 kg)   SpO2 97%   BMI 27.85 kg/m²   SPO2 (COPD values may differ): Greater than or equal to 92% on room air = 0    Peak Flow (asthma only): not applicable = 0    RSI: 0-4 = See once and convert to home regimen or discontinue        Plan       Goals: medication delivery    Patient/caregiver was educated on the proper method of use for Respiratory Care Devices:  Yes      Level of patient/caregiver understanding able to:   [] Verbalize understanding   [] Demonstrate understanding       [] Teach back        [] Needs reinforcement       []  No available caregiver               []  Other:     Response to education:  Excellent     Is patient being placed on Home Treatment Regimen? Yes     Does the patient have everything they need prior to discharge? Yes     Comments: admits with chest pain    Plan of Care: mdi albuterol prn, mdi qvar bid    Electronically signed by Marlon Hanson RCP on 7/16/2018 at 12:22 AM    Respiratory Protocol Guidelines     1. Assessment and treatment by Respiratory Therapy will be initiated for medication and therapeutic interventions upon initiation of aerosolized medication.   2. Physician will be contacted for respiratory rate (RR) greater than 35

## 2018-07-16 NOTE — DISCHARGE SUMMARY
[facility]    [facility] Demographics    Patient. Kosta Koo  Date of Birth. 1955  MRN. [id number]     Primary care provider. Kavitha Peña  (Tel: [phone])    Admit date: 7/15/2018    Discharge date (blank if same as Note Date): Note Date: 7/16/2018     Reason for Hospitalization. Chief Complaint   Patient presents with    Chest Pain     Pt walked to fire department d/t chest pain and felt like her a-fib was acting up. When she arrived to the fire department she had three witnessed seizures. Pt has hx seizure but no on medication.  Seizures           Principal Problem:    Chest pain  Active Problems:    Sinus bradycardia    HTN (hypertension)    Seizure-like activity (HCC)    Elevated troponin I level         Problems and results from this hospitalization that need follow up. 1. Chest pain, trop mild elevation 0.02. Not NSTEMI. Patient refused further evaluation  2. Sinus bradycardia, chronic. Invasive procedures and treatments. 1. None     Hospital Course. Patient was admitted with chest pain. Trop noted 0.03, 0.02. She refused NM chemical stress test.  She was cleared for discharge by Cardiology. Condition at discharge:  Stable    Consults. IP CONSULT TO HOSPITALIST  IP CONSULT TO CARDIOLOGY  IP CONSULT TO CASE MANAGEMENT    Physical examination on discharge day. /70   Pulse (!) 48   Temp 97.8 °F (36.6 °C) (Oral)   Resp 16   Ht 5' 5\" (1.651 m)   Wt 167 lb 5.3 oz (75.9 kg)   SpO2 98%   BMI 27.85 kg/m²   General appearance. Alert. Looks comfortable. HEENT. Moist mucus membranes. Cardiovascular. Regular rate and rhythm, normal S1, S2. No murmur. Respiratory. Not using accessory muscles. Clear to auscultation bilaterally, no wheeze. Gastrointestinal. Abdomen soft, non-tender, not distended, normal bowel sounds  Neurology. Facial symmetry.  No speech

## 2018-07-31 LAB
EKG ATRIAL RATE: 61 BPM
EKG DIAGNOSIS: NORMAL
EKG P AXIS: 63 DEGREES
EKG P-R INTERVAL: 144 MS
EKG Q-T INTERVAL: 426 MS
EKG QRS DURATION: 88 MS
EKG QTC CALCULATION (BAZETT): 428 MS
EKG R AXIS: 22 DEGREES
EKG T AXIS: 52 DEGREES
EKG VENTRICULAR RATE: 61 BPM

## 2018-09-03 ENCOUNTER — HOSPITAL ENCOUNTER (EMERGENCY)
Age: 63
Discharge: HOME | End: 2018-09-04
Attending: EMERGENCY MEDICINE
Payer: MEDICARE

## 2018-09-03 ENCOUNTER — APPOINTMENT (OUTPATIENT)
Dept: GENERAL RADIOLOGY | Age: 63
End: 2018-09-03
Payer: MEDICARE

## 2018-09-03 VITALS
OXYGEN SATURATION: 94 % | RESPIRATION RATE: 15 BRPM | TEMPERATURE: 97.4 F | SYSTOLIC BLOOD PRESSURE: 108 MMHG | BODY MASS INDEX: 27.82 KG/M2 | DIASTOLIC BLOOD PRESSURE: 48 MMHG | HEIGHT: 65 IN | WEIGHT: 167 LBS | HEART RATE: 59 BPM

## 2018-09-03 DIAGNOSIS — R00.2 PALPITATIONS: Primary | ICD-10-CM

## 2018-09-03 LAB
B-TYPE NATRIURETIC PEPTIDE: 68 PG/ML (ref 0–99.9)
BASOPHILS ABSOLUTE: 0 K/UL (ref 0–0.2)
BASOPHILS RELATIVE PERCENT: 0.8 %
CALCIUM IONIZED: 1.21 MMOL/L (ref 1.12–1.32)
CO2: 25 MMOL/L (ref 21–32)
EOSINOPHILS ABSOLUTE: 0 K/UL (ref 0–0.6)
EOSINOPHILS RELATIVE PERCENT: 0.3 %
GFR AFRICAN AMERICAN: >60
GFR NON-AFRICAN AMERICAN: 50
GLUCOSE BLD-MCNC: 123 MG/DL (ref 70–99)
HCT VFR BLD CALC: 40.5 % (ref 36–48)
HEMOGLOBIN: 13.7 G/DL (ref 12–16)
LYMPHOCYTES ABSOLUTE: 1.4 K/UL (ref 1–5.1)
LYMPHOCYTES RELATIVE PERCENT: 25.1 %
MCH RBC QN AUTO: 29.9 PG (ref 26–34)
MCHC RBC AUTO-ENTMCNC: 33.8 G/DL (ref 31–36)
MCV RBC AUTO: 88.2 FL (ref 80–100)
MONOCYTES ABSOLUTE: 0.4 K/UL (ref 0–1.3)
MONOCYTES RELATIVE PERCENT: 6.4 %
NEUTROPHILS ABSOLUTE: 3.9 K/UL (ref 1.7–7.7)
NEUTROPHILS RELATIVE PERCENT: 67.4 %
PDW BLD-RTO: 13 % (ref 12.4–15.4)
PERFORMED ON: ABNORMAL
PERFORMED ON: NORMAL
PERFORMED ON: NORMAL
PLATELET # BLD: 234 K/UL (ref 135–450)
PMV BLD AUTO: 8.2 FL (ref 5–10.5)
POC ANION GAP: 11 (ref 10–20)
POC BUN: 18 MG/DL (ref 7–18)
POC CHLORIDE: 107 MMOL/L (ref 99–110)
POC CREATININE: 1.1 MG/DL (ref 0.6–1.2)
POC POTASSIUM: 3.3 MMOL/L (ref 3.5–5.1)
POC SAMPLE TYPE: ABNORMAL
POC SAMPLE TYPE: NORMAL
POC SAMPLE TYPE: NORMAL
POC SODIUM: 143 MMOL/L (ref 136–145)
POC TROPONIN I: 0.01 NG/ML (ref 0–0.1)
RBC # BLD: 4.59 M/UL (ref 4–5.2)
WBC # BLD: 5.8 K/UL (ref 4–11)

## 2018-09-03 PROCEDURE — 93005 ELECTROCARDIOGRAM TRACING: CPT | Performed by: EMERGENCY MEDICINE

## 2018-09-03 PROCEDURE — 83880 ASSAY OF NATRIURETIC PEPTIDE: CPT

## 2018-09-03 PROCEDURE — 80047 BASIC METABLC PNL IONIZED CA: CPT

## 2018-09-03 PROCEDURE — 71045 X-RAY EXAM CHEST 1 VIEW: CPT

## 2018-09-03 PROCEDURE — 84484 ASSAY OF TROPONIN QUANT: CPT

## 2018-09-03 PROCEDURE — 99285 EMERGENCY DEPT VISIT HI MDM: CPT

## 2018-09-03 PROCEDURE — 85025 COMPLETE CBC W/AUTO DIFF WBC: CPT

## 2018-09-03 RX ORDER — MULTIVITAMIN
1 TABLET ORAL
COMMUNITY

## 2018-09-03 RX ORDER — HYDROCHLOROTHIAZIDE 12.5 MG/1
12.5 TABLET ORAL DAILY PRN
COMMUNITY
Start: 2018-06-27

## 2018-09-03 RX ORDER — ALBUTEROL SULFATE 90 UG/1
2 AEROSOL, METERED RESPIRATORY (INHALATION)
COMMUNITY
Start: 2018-06-27

## 2018-09-03 ASSESSMENT — PAIN DESCRIPTION - DESCRIPTORS: DESCRIPTORS: PRESSURE

## 2018-09-03 ASSESSMENT — PAIN SCALES - GENERAL: PAINLEVEL_OUTOF10: 5

## 2018-09-03 ASSESSMENT — PAIN DESCRIPTION - PAIN TYPE: TYPE: ACUTE PAIN

## 2018-09-03 ASSESSMENT — PAIN DESCRIPTION - LOCATION: LOCATION: CHEST

## 2018-09-04 NOTE — ED TRIAGE NOTES
Patient to ER for chest pain that started an hour to her arrival. Patient also states she became diaphoretic and states her legs gave out so she fell.

## 2018-09-04 NOTE — ED NOTES
PIV line dc'd without difficulty. Pt discharged to lobby via wheelchair to wait for a taxi ride home.      Toña Bailey RN  09/04/18 2370

## 2018-09-09 ASSESSMENT — ENCOUNTER SYMPTOMS
EYE ITCHING: 0
ABDOMINAL PAIN: 0
COLOR CHANGE: 0
NAUSEA: 0
COUGH: 0
SORE THROAT: 0
EYE PAIN: 0
SINUS PRESSURE: 0
CHEST TIGHTNESS: 0
ABDOMINAL DISTENTION: 0
BACK PAIN: 0
DIARRHEA: 0
WHEEZING: 0
RHINORRHEA: 0
SHORTNESS OF BREATH: 0
EYE REDNESS: 0
VOMITING: 0

## 2018-09-10 NOTE — ED PROVIDER NOTES
Diagnostic Results     EKG   Interpreted in conjunction with emergency department physician, Maryann Diaz MD.  Rhythm: normal sinus   Rate: normal  Axis: normal  Ectopy: none  Conduction: normal  ST Segments: normal  T Waves: normal  Q Waves: none  Clinical Impression: no acute changes  Comparison:  Consistent with prior ECGs    RADIOLOGY:  XR CHEST PORTABLE   Final Result      No evidence of acute cardiopulmonary process.           LABS:   Results for orders placed or performed during the hospital encounter of 09/03/18   CBC auto differential   Result Value Ref Range    WBC 5.8 4.0 - 11.0 K/uL    RBC 4.59 4.00 - 5.20 M/uL    Hemoglobin 13.7 12.0 - 16.0 g/dL    Hematocrit 40.5 36.0 - 48.0 %    MCV 88.2 80.0 - 100.0 fL    MCH 29.9 26.0 - 34.0 pg    MCHC 33.8 31.0 - 36.0 g/dL    RDW 13.0 12.4 - 15.4 %    Platelets 658 566 - 609 K/uL    MPV 8.2 5.0 - 10.5 fL    Neutrophils % 67.4 %    Lymphocytes % 25.1 %    Monocytes % 6.4 %    Eosinophils % 0.3 %    Basophils % 0.8 %    Neutrophils # 3.9 1.7 - 7.7 K/uL    Lymphocytes # 1.4 1.0 - 5.1 K/uL    Monocytes # 0.4 0.0 - 1.3 K/uL    Eosinophils # 0.0 0.0 - 0.6 K/uL    Basophils # 0.0 0.0 - 0.2 K/uL   POCT Venous   Result Value Ref Range    POC Sodium 143 136 - 145 mmol/L    POC Potassium 3.3 (L) 3.5 - 5.1 mmol/L    POC Chloride 107 99 - 110 mmol/L    CO2 25 21 - 32 mmol/L    POC Anion Gap 11 10 - 20    POC Glucose 123 (H) 70 - 99 mg/dl    POC BUN 18 7 - 18 mg/dL    POC Creatinine 1.1 0.6 - 1.2 mg/dL    GFR Non-African American 50 (A) >60    GFR African American >60     Calcium, Ion 1.21 1.12 - 1.32 mmol/L    Sample Type SUSIE     Performed on SEE BELOW    POCT Venous   Result Value Ref Range    BNP 68.0 0.0 - 99.9 pg/mL    Sample Type SUSIE     Performed on SEE BELOW    POCT Venous   Result Value Ref Range    POC Troponin I 0.01 0.00 - 0.10 ng/mL    Sample Type SUSIE     Performed on SEE BELOW    EKG 12 Lead   Result Value Ref Range    Ventricular Rate 61 BPM    Atrial Rate 61

## 2018-09-14 LAB
EKG ATRIAL RATE: 61 BPM
EKG DIAGNOSIS: NORMAL
EKG P AXIS: 56 DEGREES
EKG P-R INTERVAL: 148 MS
EKG Q-T INTERVAL: 394 MS
EKG QRS DURATION: 80 MS
EKG QTC CALCULATION (BAZETT): 396 MS
EKG R AXIS: 15 DEGREES
EKG T AXIS: 38 DEGREES
EKG VENTRICULAR RATE: 61 BPM

## 2018-10-07 ENCOUNTER — HOSPITAL ENCOUNTER (EMERGENCY)
Age: 63
Discharge: HOME | End: 2018-10-07
Attending: EMERGENCY MEDICINE
Payer: MEDICARE

## 2018-10-07 ENCOUNTER — APPOINTMENT (OUTPATIENT)
Dept: CT IMAGING | Age: 63
End: 2018-10-07
Payer: MEDICARE

## 2018-10-07 VITALS
HEART RATE: 50 BPM | RESPIRATION RATE: 12 BRPM | TEMPERATURE: 98.8 F | DIASTOLIC BLOOD PRESSURE: 63 MMHG | OXYGEN SATURATION: 95 % | SYSTOLIC BLOOD PRESSURE: 113 MMHG

## 2018-10-07 DIAGNOSIS — R10.13 ABDOMINAL PAIN, EPIGASTRIC: Primary | ICD-10-CM

## 2018-10-07 DIAGNOSIS — K29.00 ACUTE GASTRITIS WITHOUT HEMORRHAGE, UNSPECIFIED GASTRITIS TYPE: ICD-10-CM

## 2018-10-07 LAB
ALBUMIN SERPL-MCNC: 4.3 G/DL (ref 3.4–5)
ALP BLD-CCNC: 39 U/L (ref 40–129)
ALT SERPL-CCNC: 112 U/L (ref 10–40)
ANION GAP SERPL CALCULATED.3IONS-SCNC: 11 MMOL/L (ref 3–16)
AST SERPL-CCNC: 153 U/L (ref 15–37)
BACTERIA: ABNORMAL /HPF
BASOPHILS ABSOLUTE: 0 K/UL (ref 0–0.2)
BASOPHILS RELATIVE PERCENT: 0.6 %
BILIRUB SERPL-MCNC: 0.4 MG/DL (ref 0–1)
BILIRUBIN DIRECT: <0.2 MG/DL (ref 0–0.3)
BILIRUBIN URINE: NEGATIVE MG/DL
BILIRUBIN, INDIRECT: ABNORMAL MG/DL (ref 0–1)
BLOOD, URINE: ABNORMAL
BUN BLDV-MCNC: 18 MG/DL (ref 7–20)
CALCIUM SERPL-MCNC: 9.2 MG/DL (ref 8.3–10.6)
CHLORIDE BLD-SCNC: 105 MMOL/L (ref 99–110)
CLARITY: ABNORMAL
CO2: 24 MMOL/L (ref 21–32)
COLOR: ABNORMAL
CREAT SERPL-MCNC: 0.7 MG/DL (ref 0.6–1.2)
EOSINOPHILS ABSOLUTE: 0 K/UL (ref 0–0.6)
EOSINOPHILS RELATIVE PERCENT: 0.2 %
EPITHELIAL CELLS, UA: ABNORMAL /HPF
GFR AFRICAN AMERICAN: >60
GFR NON-AFRICAN AMERICAN: >60
GLUCOSE BLD-MCNC: 115 MG/DL (ref 70–99)
GLUCOSE URINE: NEGATIVE MG/DL
HCT VFR BLD CALC: 39.8 % (ref 36–48)
HEMOGLOBIN: 13.2 G/DL (ref 12–16)
KETONES, URINE: NEGATIVE MG/DL
LEUKOCYTE ESTERASE, URINE: ABNORMAL
LIPASE: 60 U/L (ref 13–60)
LYMPHOCYTES ABSOLUTE: 1.2 K/UL (ref 1–5.1)
LYMPHOCYTES RELATIVE PERCENT: 17.5 %
MCH RBC QN AUTO: 29.1 PG (ref 26–34)
MCHC RBC AUTO-ENTMCNC: 33 G/DL (ref 31–36)
MCV RBC AUTO: 88.2 FL (ref 80–100)
MICROSCOPIC EXAMINATION: YES
MONOCYTES ABSOLUTE: 0.5 K/UL (ref 0–1.3)
MONOCYTES RELATIVE PERCENT: 6.9 %
NEUTROPHILS ABSOLUTE: 5.3 K/UL (ref 1.7–7.7)
NEUTROPHILS RELATIVE PERCENT: 74.8 %
NITRITE, URINE: NEGATIVE
PDW BLD-RTO: 13 % (ref 12.4–15.4)
PH UA: 7
PLATELET # BLD: 232 K/UL (ref 135–450)
PMV BLD AUTO: 7.9 FL (ref 5–10.5)
POTASSIUM SERPL-SCNC: 4.4 MMOL/L (ref 3.5–5.1)
PROTEIN UA: NEGATIVE MG/DL
RBC # BLD: 4.52 M/UL (ref 4–5.2)
RBC UA: ABNORMAL /HPF (ref 0–2)
SODIUM BLD-SCNC: 140 MMOL/L (ref 136–145)
SPECIFIC GRAVITY UA: 1.01
TOTAL PROTEIN: 6.5 G/DL (ref 6.4–8.2)
TROPONIN: <0.01 NG/ML
UROBILINOGEN, URINE: 0.2 E.U./DL
WBC # BLD: 7.1 K/UL (ref 4–11)
WBC UA: ABNORMAL /HPF (ref 0–5)

## 2018-10-07 PROCEDURE — 6370000000 HC RX 637 (ALT 250 FOR IP): Performed by: EMERGENCY MEDICINE

## 2018-10-07 PROCEDURE — 74176 CT ABD & PELVIS W/O CONTRAST: CPT

## 2018-10-07 PROCEDURE — 6360000002 HC RX W HCPCS: Performed by: EMERGENCY MEDICINE

## 2018-10-07 PROCEDURE — 85025 COMPLETE CBC W/AUTO DIFF WBC: CPT

## 2018-10-07 PROCEDURE — 84484 ASSAY OF TROPONIN QUANT: CPT

## 2018-10-07 PROCEDURE — 2580000003 HC RX 258: Performed by: EMERGENCY MEDICINE

## 2018-10-07 PROCEDURE — 93005 ELECTROCARDIOGRAM TRACING: CPT | Performed by: EMERGENCY MEDICINE

## 2018-10-07 PROCEDURE — 81001 URINALYSIS AUTO W/SCOPE: CPT

## 2018-10-07 PROCEDURE — 96374 THER/PROPH/DIAG INJ IV PUSH: CPT

## 2018-10-07 PROCEDURE — 96372 THER/PROPH/DIAG INJ SC/IM: CPT

## 2018-10-07 PROCEDURE — 99284 EMERGENCY DEPT VISIT MOD MDM: CPT

## 2018-10-07 PROCEDURE — 96361 HYDRATE IV INFUSION ADD-ON: CPT

## 2018-10-07 PROCEDURE — 96376 TX/PRO/DX INJ SAME DRUG ADON: CPT

## 2018-10-07 PROCEDURE — 83690 ASSAY OF LIPASE: CPT

## 2018-10-07 PROCEDURE — 80076 HEPATIC FUNCTION PANEL: CPT

## 2018-10-07 PROCEDURE — 80048 BASIC METABOLIC PNL TOTAL CA: CPT

## 2018-10-07 RX ORDER — ONDANSETRON 2 MG/ML
4 INJECTION INTRAMUSCULAR; INTRAVENOUS ONCE
Status: COMPLETED | OUTPATIENT
Start: 2018-10-07 | End: 2018-10-07

## 2018-10-07 RX ORDER — PROMETHAZINE HYDROCHLORIDE 25 MG/ML
12.5 INJECTION, SOLUTION INTRAMUSCULAR; INTRAVENOUS ONCE
Status: DISCONTINUED | OUTPATIENT
Start: 2018-10-07 | End: 2018-10-07

## 2018-10-07 RX ORDER — DICYCLOMINE HYDROCHLORIDE 10 MG/ML
20 INJECTION INTRAMUSCULAR ONCE
Status: COMPLETED | OUTPATIENT
Start: 2018-10-07 | End: 2018-10-07

## 2018-10-07 RX ORDER — 0.9 % SODIUM CHLORIDE 0.9 %
1000 INTRAVENOUS SOLUTION INTRAVENOUS ONCE
Status: COMPLETED | OUTPATIENT
Start: 2018-10-07 | End: 2018-10-07

## 2018-10-07 RX ADMIN — ALUMINUM HYDROXIDE, MAGNESIUM HYDROXIDE, AND SIMETHICONE: 200; 200; 20 SUSPENSION ORAL at 10:14

## 2018-10-07 RX ADMIN — DICYCLOMINE HYDROCHLORIDE 20 MG: 20 INJECTION, SOLUTION INTRAMUSCULAR at 04:49

## 2018-10-07 RX ADMIN — SODIUM CHLORIDE 1000 ML: 0.9 INJECTION, SOLUTION INTRAVENOUS at 04:57

## 2018-10-07 RX ADMIN — ONDANSETRON 4 MG: 2 INJECTION INTRAMUSCULAR; INTRAVENOUS at 08:51

## 2018-10-07 RX ADMIN — ONDANSETRON 4 MG: 2 INJECTION INTRAMUSCULAR; INTRAVENOUS at 04:57

## 2018-10-07 ASSESSMENT — ENCOUNTER SYMPTOMS
BLOOD IN STOOL: 0
NAUSEA: 1
ANAL BLEEDING: 0
VOMITING: 1
BACK PAIN: 0
CHEST TIGHTNESS: 0
CONSTIPATION: 0
DIARRHEA: 0
ABDOMINAL PAIN: 1
SHORTNESS OF BREATH: 0

## 2018-10-07 ASSESSMENT — PAIN DESCRIPTION - PAIN TYPE: TYPE: ACUTE PAIN

## 2018-10-07 ASSESSMENT — PAIN DESCRIPTION - ORIENTATION: ORIENTATION: MID;UPPER

## 2018-10-07 ASSESSMENT — PAIN DESCRIPTION - LOCATION: LOCATION: ABDOMEN

## 2018-10-07 ASSESSMENT — PAIN SCALES - GENERAL: PAINLEVEL_OUTOF10: 10

## 2018-10-07 NOTE — ED PROVIDER NOTES
Disease in her mother; Diabetes in her father and mother; High Blood Pressure in her mother. She reports that she has never smoked. She has never used smokeless tobacco. She reports that she does not drink alcohol or use drugs. Medications     Previous Medications    ACETAMINOPHEN (TYLENOL) 325 MG TABLET    Take 2 tablets by mouth every 6 hours as needed for Pain    ALBUTEROL SULFATE  (90 BASE) MCG/ACT INHALER    Inhale 2 puffs into the lungs    ASPIRIN EC 81 MG EC TABLET    Take 81 mg by mouth daily    DIAZEPAM (VALIUM) 5 MG TABLET    Take 5 mg by mouth every 12 hours as needed     ESCITALOPRAM (LEXAPRO) 10 MG TABLET    Take 5 mg by mouth nightly     FLUTICASONE (FLONASE) 50 MCG/ACT NASAL SPRAY    1 spray by Nasal route daily    FLUTICASONE (FLOVENT HFA) 220 MCG/ACT INHALER    Inhale 1 puff into the lungs 2 times daily    HYDROCHLOROTHIAZIDE (HYDRODIURIL) 12.5 MG TABLET    Take 12.5 mg by mouth    LEVOTHYROXINE (SYNTHROID) 75 MCG TABLET    Take 75 mcg by mouth Daily    MULTIPLE VITAMIN (MULTIVITAMIN) TABLET    Take 1 tablet by mouth    NYSTATIN (MYCOSTATIN) 300190 UNIT/ML SUSPENSION    500,000 Units    PANTOPRAZOLE (PROTONIX) 20 MG TABLET    Take 1 tablet by mouth 2 times daily    VITAMIN B-12 (CYANOCOBALAMIN) 100 MCG TABLET    Take 50 mcg by mouth daily    VITAMIN D (CHOLECALCIFEROL) 1000 UNIT TABS TABLET    Take 1,000 Units by mouth daily       Allergies     She is allergic to ciprofloxacin; phenytoin; demerol; iodides; penicillins; vancomycin; avelox [moxifloxacin hydrochloride]; betamethasone; clarithromycin; dye [barium-containing compounds]; gadolinium; hydrocodone; hydromorphone; ibuprofen; influenza a (h1n1) monoval vac; phenytoin sodium extended; pneumococcal vaccine; quinolones; shellfish-derived products; sulfa antibiotics; tetanus toxoids; alprazolam; codeine; meperidine; and morphine.     Physical Exam     INITIAL VITALS: BP: 112/69, Temp: 98.8 °F (37.1 °C), Pulse: 53, Resp: 20, SpO2: 97 %

## 2018-10-07 NOTE — ED PROVIDER NOTES
Result Value Ref Range    Total Protein 6.5 6.4 - 8.2 g/dL    Alb 4.3 3.4 - 5.0 g/dL    Alkaline Phosphatase 39 (L) 40 - 129 U/L     (H) 10 - 40 U/L     (H) 15 - 37 U/L    Total Bilirubin 0.4 0.0 - 1.0 mg/dL    Bilirubin, Direct <0.2 0.0 - 0.3 mg/dL    Bilirubin, Indirect see below 0.0 - 1.0 mg/dL   Troponin (lab)   Result Value Ref Range    Troponin <0.01 <0.01 ng/mL   Microscopic Urinalysis   Result Value Ref Range    WBC, UA 3-5 0 - 5 /HPF    RBC, UA 0-2 0 - 2 /HPF    Epi Cells 0-2 /HPF    Bacteria, UA 2+ (A) /HPF   POC URINE with Microscopic   Result Value Ref Range    Color, UA Not Entered Straw/Yellow    Clarity, UA Not Entered Clear    Glucose, Ur Negative Negative mg/dL    Bilirubin Urine Negative Negative mg/dL    Ketones, Urine Negative Negative mg/dL    Specific Gravity, UA 1.015 1.005 - 1.030    Blood, Urine TRACE-INTACT (A) Negative    pH, UA 7.0 5.0 - 8.0    Protein, UA Negative Negative mg/dL    Urobilinogen, Urine 0.2 <2.0 E.U./dL    Nitrite, Urine Negative Negative    Leukocyte Esterase, Urine SMALL (A) Negative    Microscopic Examination Yes    EKG 12 Lead   Result Value Ref Range    Ventricular Rate 55 BPM    Atrial Rate 55 BPM    P-R Interval 150 ms    QRS Duration 88 ms    Q-T Interval 450 ms    QTc Calculation (Bazett) 430 ms    P Axis 59 degrees    R Axis 15 degrees    T Axis 38 degrees    Diagnosis       EKG performed in ER and to be interpreted by ER physician. Confirmed by MD, ER ([id number]),  Karen Varghese ([phone]) on 10/7/2018 9:02:29 AM       RECENT VITALS:  BP: (!) 102/55, Temp: 98.8 °F (37.1 °C), Pulse: 74, Resp: 20, SpO2: 95 %     Procedures       ED Course     The patient was given the following medications:  Orders Placed This Encounter   Medications    0.9 % sodium chloride bolus    ondansetron (ZOFRAN) injection 4 mg    dicyclomine (BENTYL) injection 20 mg    DISCONTD: promethazine (PHENERGAN) injection 12.5 mg    ondansetron (ZOFRAN) injection 4 mg    aluminum &

## 2018-10-12 LAB
EKG ATRIAL RATE: 55 BPM
EKG DIAGNOSIS: NORMAL
EKG P AXIS: 59 DEGREES
EKG P-R INTERVAL: 150 MS
EKG Q-T INTERVAL: 450 MS
EKG QRS DURATION: 88 MS
EKG QTC CALCULATION (BAZETT): 430 MS
EKG R AXIS: 15 DEGREES
EKG T AXIS: 38 DEGREES
EKG VENTRICULAR RATE: 55 BPM

## 2019-05-09 ENCOUNTER — APPOINTMENT (OUTPATIENT)
Dept: GENERAL RADIOLOGY | Age: 64
End: 2019-05-09
Payer: MEDICARE

## 2019-05-09 ENCOUNTER — APPOINTMENT (OUTPATIENT)
Dept: CT IMAGING | Age: 64
End: 2019-05-09
Payer: MEDICARE

## 2019-05-09 ENCOUNTER — HOSPITAL ENCOUNTER (EMERGENCY)
Age: 64
Discharge: HOME | End: 2019-05-10
Attending: EMERGENCY MEDICINE
Payer: MEDICARE

## 2019-05-09 DIAGNOSIS — R56.9 NONEPILEPTIC EPISODE (HCC): Primary | ICD-10-CM

## 2019-05-09 LAB
ANION GAP SERPL CALCULATED.3IONS-SCNC: 10 MMOL/L (ref 3–16)
BASE EXCESS VENOUS: 1 (ref -3–3)
BASOPHILS ABSOLUTE: 0 K/UL (ref 0–0.2)
BASOPHILS RELATIVE PERCENT: 0.8 %
BILIRUBIN URINE: NEGATIVE
BLOOD, URINE: NEGATIVE
BUN BLDV-MCNC: 22 MG/DL (ref 7–20)
CALCIUM SERPL-MCNC: 9.3 MG/DL (ref 8.3–10.6)
CHLORIDE BLD-SCNC: 105 MMOL/L (ref 99–110)
CLARITY: CLEAR
CO2: 26 MMOL/L (ref 21–32)
COLOR: YELLOW
CREAT SERPL-MCNC: 0.8 MG/DL (ref 0.6–1.2)
EOSINOPHILS ABSOLUTE: 0 K/UL (ref 0–0.6)
EOSINOPHILS RELATIVE PERCENT: 0.8 %
GFR AFRICAN AMERICAN: >60
GFR NON-AFRICAN AMERICAN: >60
GLUCOSE BLD-MCNC: 98 MG/DL (ref 70–99)
GLUCOSE URINE: NEGATIVE MG/DL
HCO3 VENOUS: 26.6 MMOL/L (ref 23–29)
HCT VFR BLD CALC: 40 % (ref 36–48)
HEMOGLOBIN: 13.5 G/DL (ref 12–16)
INR BLD: 1.09 (ref 0.86–1.14)
KETONES, URINE: NEGATIVE MG/DL
LEUKOCYTE ESTERASE, URINE: NEGATIVE
LYMPHOCYTES ABSOLUTE: 1.7 K/UL (ref 1–5.1)
LYMPHOCYTES RELATIVE PERCENT: 35.1 %
MCH RBC QN AUTO: 29.5 PG (ref 26–34)
MCHC RBC AUTO-ENTMCNC: 33.7 G/DL (ref 31–36)
MCV RBC AUTO: 87.5 FL (ref 80–100)
MICROSCOPIC EXAMINATION: NORMAL
MONOCYTES ABSOLUTE: 0.4 K/UL (ref 0–1.3)
MONOCYTES RELATIVE PERCENT: 7.7 %
NEUTROPHILS ABSOLUTE: 2.7 K/UL (ref 1.7–7.7)
NEUTROPHILS RELATIVE PERCENT: 55.6 %
NITRITE, URINE: NEGATIVE
O2 SAT, VEN: 36 %
PCO2, VEN: 46 MM HG (ref 40–50)
PDW BLD-RTO: 13.1 % (ref 12.4–15.4)
PERFORMED ON: NORMAL
PH UA: 6.5 (ref 5–8)
PH VENOUS: 7.37 (ref 7.35–7.45)
PLATELET # BLD: 248 K/UL (ref 135–450)
PMV BLD AUTO: 7.8 FL (ref 5–10.5)
PO2, VEN: 22 MM HG
POC SAMPLE TYPE: NORMAL
POTASSIUM REFLEX MAGNESIUM: 4.1 MMOL/L (ref 3.5–5.1)
PRO-BNP: 149 PG/ML (ref 0–124)
PROTEIN UA: NEGATIVE MG/DL
PROTHROMBIN TIME: 12.4 SEC (ref 9.8–13)
RBC # BLD: 4.57 M/UL (ref 4–5.2)
SODIUM BLD-SCNC: 141 MMOL/L (ref 136–145)
SPECIFIC GRAVITY UA: <=1.005 (ref 1–1.03)
TCO2 CALC VENOUS: 28 MMOL/L
TROPONIN: <0.01 NG/ML
URINE TYPE: NORMAL
UROBILINOGEN, URINE: 0.2 E.U./DL
WBC # BLD: 4.9 K/UL (ref 4–11)

## 2019-05-09 PROCEDURE — 85025 COMPLETE CBC W/AUTO DIFF WBC: CPT

## 2019-05-09 PROCEDURE — 93005 ELECTROCARDIOGRAM TRACING: CPT | Performed by: EMERGENCY MEDICINE

## 2019-05-09 PROCEDURE — 71046 X-RAY EXAM CHEST 2 VIEWS: CPT

## 2019-05-09 PROCEDURE — 81003 URINALYSIS AUTO W/O SCOPE: CPT

## 2019-05-09 PROCEDURE — 83880 ASSAY OF NATRIURETIC PEPTIDE: CPT

## 2019-05-09 PROCEDURE — 80048 BASIC METABOLIC PNL TOTAL CA: CPT

## 2019-05-09 PROCEDURE — 99284 EMERGENCY DEPT VISIT MOD MDM: CPT

## 2019-05-09 PROCEDURE — 85610 PROTHROMBIN TIME: CPT

## 2019-05-09 PROCEDURE — 70450 CT HEAD/BRAIN W/O DYE: CPT

## 2019-05-09 PROCEDURE — 84484 ASSAY OF TROPONIN QUANT: CPT

## 2019-05-09 PROCEDURE — 82803 BLOOD GASES ANY COMBINATION: CPT

## 2019-05-09 ASSESSMENT — ENCOUNTER SYMPTOMS
NAUSEA: 0
VOMITING: 0
SORE THROAT: 0
EYE REDNESS: 0
SINUS PRESSURE: 0
WHEEZING: 0
EYE DISCHARGE: 0
COUGH: 0
SHORTNESS OF BREATH: 0
DIARRHEA: 0
ABDOMINAL PAIN: 0

## 2019-05-09 ASSESSMENT — PAIN SCALES - GENERAL: PAINLEVEL_OUTOF10: 4

## 2019-05-09 ASSESSMENT — PAIN DESCRIPTION - PAIN TYPE: TYPE: ACUTE PAIN

## 2019-05-09 ASSESSMENT — PAIN DESCRIPTION - LOCATION: LOCATION: LEG

## 2019-05-09 ASSESSMENT — PAIN DESCRIPTION - ORIENTATION: ORIENTATION: LEFT

## 2019-05-09 NOTE — ED PROVIDER NOTES
[address] [facility] RESIDENT NOTE       Date of evaluation: 5/9/2019    Chief Complaint     Dizziness and Leg Pain      History of Present Illness     Yumiko Rubio is a 75 y.o. female who presents with a spell and possible syncope. Patient reports she was feeling well this morning and was seated at her computer when she felt what she describes as her \"seizure\" aura. She states that she felt lightheaded but did not pass out. She states that she had 5 mg of oral Valium which helped her feel better and she did not seize or lose consciousness. She denies urinary or stool incontinence. She denies chest pain. She then went to lay in her bed after taking the Valium and after lying down for a period of time, she went to rise from a lying to a standing position. She notes that at this time she felt dizzy and lightheaded. She felt as if she might pass out. She's not had chest pain or shortness of breath prior to this. She had no numbness, chilling, or weakness prior to this episode. She grasped the table and lowered herself to the floor. She noted that after she was in the floor, she had weakness of her left lower extremity. States that she was unable to \"bend it\" underneath her. She then called her nurse who told her she was concerned the patient was having a stroke and she should present for evaluation. At this time, she is persistent weakness in her left lower extremity and diminished sensation in her left lower external knee. She has no pain. She states that she did not strike her head or her neck when she fell. She states that she has to did not fall to the ground but lowered herself. She did not strike her hips.   She states she's never had anything like this in the past.  However, the patient doesn't that she has a history of strokes, seizures (although she was taken off her seizure medication by her neurologist), and \"Sravan's paralysis\" following one of her strokes. Symptom onset at approx 1300. Denies exacerbating or relieving symptoms except those as noted above. Review of Systems     Review of Systems   Constitutional: Negative for activity change, appetite change, chills and fever. HENT: Negative for ear pain, sinus pressure and sore throat. Eyes: Negative for discharge and redness. Respiratory: Negative for cough, shortness of breath and wheezing. Cardiovascular: Negative for chest pain. Gastrointestinal: Negative for abdominal pain, diarrhea, nausea and vomiting. Genitourinary: Negative for dysuria, flank pain and hematuria. Musculoskeletal: Negative for arthralgias, myalgias, neck pain and neck stiffness. Skin: Negative for rash. Neurological: Positive for dizziness, seizures, light-headedness, numbness and headaches. Negative for syncope. Hematological: Negative. Psychiatric/Behavioral: Negative for agitation, hallucinations and self-injury. All other systems reviewed and are negative. Past Medical, Surgical, Family, and Social History     She has a past medical history of Arrhythmia, Asthma, Atrial fibrillation ([address] CAD (coronary artery disease), Gastroparesis, Hypertension, PAF (paroxysmal atrial fibrillation) ([address] Psychiatric disorder, Seizures ([address] Sick sinus syndrome ([address] Tachy-jazmin syndrome ([address] and Unspecified cerebral artery occlusion with cerebral infarction. She has a past surgical history that includes Appendectomy; Hysterectomy; and Breast surgery. Her family history includes Alzheimer's Disease in her mother; Diabetes in her father and mother; High Blood Pressure in her mother. She reports that she has never smoked. She has never used smokeless tobacco. She reports that she does not drink alcohol or use drugs.     Medications     Previous Medications    ACETAMINOPHEN (TYLENOL) 325 MG TABLET    Take 2 tablets by mouth every 6 hours as needed for Pain    ALBUTEROL SULFATE  (90 BASE) MCG/ACT INHALER    Inhale 2 puffs into the lungs    ASPIRIN EC 81 MG EC TABLET    Take 81 mg by mouth daily    DIAZEPAM (VALIUM) 5 MG TABLET    Take 5 mg by mouth every 12 hours as needed     ESCITALOPRAM (LEXAPRO) 10 MG TABLET    Take 5 mg by mouth nightly     FLUTICASONE (FLONASE) 50 MCG/ACT NASAL SPRAY    1 spray by Nasal route daily    FLUTICASONE (FLOVENT HFA) 220 MCG/ACT INHALER    Inhale 1 puff into the lungs 2 times daily    HYDROCHLOROTHIAZIDE (HYDRODIURIL) 12.5 MG TABLET    Take 12.5 mg by mouth    LEVOTHYROXINE (SYNTHROID) 75 MCG TABLET    Take 75 mcg by mouth Daily    MULTIPLE VITAMIN (MULTIVITAMIN) TABLET    Take 1 tablet by mouth    NYSTATIN (MYCOSTATIN) 949875 UNIT/ML SUSPENSION    500,000 Units    PANTOPRAZOLE (PROTONIX) 20 MG TABLET    Take 1 tablet by mouth 2 times daily    VITAMIN B-12 (CYANOCOBALAMIN) 100 MCG TABLET    Take 50 mcg by mouth daily    VITAMIN D (CHOLECALCIFEROL) 1000 UNIT TABS TABLET    Take 1,000 Units by mouth daily       Allergies     She is allergic to ciprofloxacin; phenytoin; demerol; iodides; penicillins; vancomycin; avelox [moxifloxacin hydrochloride]; betamethasone; clarithromycin; dye [barium-containing compounds]; gadolinium; hydrocodone; hydromorphone; ibuprofen; influenza a (h1n1) monoval vac; phenytoin sodium extended; pneumococcal vaccine; quinolones; shellfish-derived products; sulfa antibiotics; tetanus toxoids; alprazolam; codeine; meperidine; and morphine. Physical Exam     INITIAL VITALS: BP: 110/64, Temp: 98.3 °F (36.8 °C), Pulse: 65, Resp: 16, SpO2: 98 %   Physical Exam   Constitutional: She is oriented to person, place, and time. She appears well-developed and well-nourished. No distress. HENT:   Head: Normocephalic and atraumatic. Right Ear: External ear normal.   Left Ear: External ear normal.   Mouth/Throat: Oropharynx is clear and moist. No oropharyngeal exudate. Eyes: Pupils are equal, round, and reactive to light.  Conjunctivae and EOM are normal. Right eye exhibits no discharge. No scleral icterus. Neck: Normal range of motion. Neck supple. Cardiovascular: Normal rate, regular rhythm, normal heart sounds and intact distal pulses. Exam reveals no gallop and no friction rub. No murmur heard. Pulmonary/Chest: Effort normal and breath sounds normal. No stridor. No respiratory distress. She has no wheezes. She has no rales. She exhibits no tenderness. Abdominal: Soft. She exhibits no distension and no mass. There is no tenderness. There is no rebound and no guarding. Musculoskeletal: Normal range of motion. She exhibits no edema or deformity. Neurological: She is alert and oriented to person, place, and time. No cranial nerve deficit. Coordination normal.   Patient has muscle firing but no antigravity strength in all muscle groups of LLE, SILT but subjectively diminishes on LLE compared to right   Skin: Skin is warm. No rash noted. She is not diaphoretic. No erythema. Psychiatric: She has a normal mood and affect. Her behavior is normal. Judgment and thought content normal.   Vitals reviewed. DiagnosticResults     EKG   Interpreted in conjunction with emergencydepartment physician Dany Gonsalves, *  Rhythm: sinus bradycardia  Rate: 56 bpm  Axis: normal  Ectopy: none  Conduction: normal  ST Segments: no acute change  T Waves:no acute change  Q Waves: none  Clinical Impression: sinus bradycardia, unchanged from comparison  Comparison:  10/7/2018 - sinus bradycardia    RADIOLOGY:  XR CHEST STANDARD (2 VW)    (Results Pending)       LABS:   No results found for this visit on 05/09/19. ED BEDSIDE ULTRASOUND:  none    RECENT VITALS:  BP: 110/64, Temp: 98.3 °F (36.8 °C), Pulse: 65,Resp: 16, SpO2: 98 %     Procedures     none    ED Course     Nursing Notes, Past Medical Hx, Past Surgical Hx, Social Hx, Allergies, and Family Hx were reviewed.     The patient was given the followingmedications:  No orders of the defined types were placed in this encounter. CONSULTS:  None    MEDICAL DECISION MAKING / ASSESSMENT / PLAN     Jacqueline Aranda is a 75 y.o. female presenting with a spell of unknown etiology. Upon arrival, the patient is hemodynamically stable, not tachycardic, not tachypnea, not hypoxic, and afebrile. IV access was established and laboratory evaluation was obtained and was pending at the time of signout. This is a 58 yo female with complex past medical history presenting with a possible presyncope versus seizure versus other spell of unknown etiology now with left lower extremity weakness and subjectively diminished sensation on exam.  The bulk of her workup is still pending at this time. EKG was reviewed and was unchanged from prior at sinus bradycardia rate of 56 BPM.  Chest x-ray and noncontrast CT scan of her head were ordered but were so pending at the time of signout as well as the remainder of her laboratory evaluation. Although the patient does have left lower [place] weakness, onset of her symptoms was at 1300, over 6 hours ago, and therefore the patient is not at TPA candidate at this time. At this time, care of the patient is consented to my colleague, his response to these will include: Follow-up pending laboratory evaluation, follow-up pending imaging studies, reassess, final disposition    This patient was also evaluated by the attending physician. All care plans werediscussed and agreed upon. Clinical Impression     No diagnosis found. Disposition     PATIENT REFERRED TO:  No follow-up provider specified.     DISCHARGE MEDICATIONS:  New Prescriptions    No medications on file       DISPOSITION         Fredy Spears MD  Resident  05/09/19 0269

## 2019-05-09 NOTE — ED NOTES
Pt had accident and wet herself. Underwear thrown away per pt's request. Brief placed and donna placed under pt.       Inge Avendaño RN  05/09/19 [id number]

## 2019-05-09 NOTE — ED NOTES
Pt looked at me and stated, \" I feel funny, I'm going to have a seizure. \" Pt started jerking back and forth and slurred her words. Pt was jerking and out for about 3-4 minutes. Md aware.       Dennis Collier RN  05/09/19 [id number]

## 2019-05-09 NOTE — ED PROVIDER NOTES
[address] [facility] 71 ENCOUNTER          PHYSICIAN ASSISTANT NOTE     Date of evaluation: 5/9/2019    ADDENDUM:      Care of this patient was assumed from my colleague, Dr. Junior Shen. The patient was seen for Dizziness and Leg Pain  . The patient's initial evaluation and plan have been discussed with the prior provider who initially evaluated the patient. Nursing Notes, Past Medical Hx, Past Surgical Hx, Social Hx, Allergies, and Family Hx were all reviewed. Diagnostic Results     EKG   Please see the prior provider's note for any ECG interpretation. RADIOLOGY:  CT Head WO Contrast   Final Result      Normal noncontrast CT the brain without acute hemorrhage, edema or hydrocephalus. No active sinus disease      XR CHEST STANDARD (2 VW)   Final Result      No acute process or active consolidation. Hyperinflated lungs are stable. Stable granulomata.           LABS:   Results for orders placed or performed during the hospital encounter of 05/09/19   CBC Auto Differential   Result Value Ref Range    WBC 4.9 4.0 - 11.0 K/uL    RBC 4.57 4.00 - 5.20 M/uL    Hemoglobin 13.5 12.0 - 16.0 g/dL    Hematocrit 40.0 36.0 - 48.0 %    MCV 87.5 80.0 - 100.0 fL    MCH 29.5 26.0 - 34.0 pg    MCHC 33.7 31.0 - 36.0 g/dL    RDW 13.1 12.4 - 15.4 %    Platelets 141 208 - 728 K/uL    MPV 7.8 5.0 - 10.5 fL    Neutrophils % 55.6 %    Lymphocytes % 35.1 %    Monocytes % 7.7 %    Eosinophils % 0.8 %    Basophils % 0.8 %    Neutrophils # 2.7 1.7 - 7.7 K/uL    Lymphocytes # 1.7 1.0 - 5.1 K/uL    Monocytes # 0.4 0.0 - 1.3 K/uL    Eosinophils # 0.0 0.0 - 0.6 K/uL    Basophils # 0.0 0.0 - 0.2 K/uL   Basic Metabolic Panel w/ Reflex to MG   Result Value Ref Range    Sodium 141 136 - 145 mmol/L    Potassium reflex Magnesium 4.1 3.5 - 5.1 mmol/L    Chloride 105 99 - 110 mmol/L    CO2 26 21 - 32 mmol/L    Anion Gap 10 3 - 16    Glucose 98 70 - 99 mg/dL    BUN 22 (H) 7 - 20 mg/dL    CREATININE 0.8 0.6 - 1.2 mg/dL GFR Non-African American >60 >60    GFR African American >60 >60    Calcium 9.3 8.3 - 10.6 mg/dL   Troponin   Result Value Ref Range    Troponin <0.01 <0.01 ng/mL   Brain Natriuretic Peptide   Result Value Ref Range    Pro- (H) 0 - 124 pg/mL   Protime-INR   Result Value Ref Range    Protime 12.4 9.8 - 13.0 sec    INR 1.09 0.86 - 1.14   Urinalysis, reflex to microscopic   Result Value Ref Range    Color, UA Yellow Straw/Yellow    Clarity, UA Clear Clear    Glucose, Ur Negative Negative mg/dL    Bilirubin Urine Negative Negative    Ketones, Urine Negative Negative mg/dL    Specific Gravity, UA <=1.005 1.005 - 1.030    Blood, Urine Negative Negative    pH, UA 6.5 5.0 - 8.0    Protein, UA Negative Negative mg/dL    Urobilinogen, Urine 0.2 <2.0 E.U./dL    Nitrite, Urine Negative Negative    Leukocyte Esterase, Urine Negative Negative    Microscopic Examination Not Indicated     Urine Type Not Specified    POCT Venous   Result Value Ref Range    pH, Susie 7.370 7.350 - 7.450    pCO2, Susie 46.0 40.0 - 50.0 mm Hg    pO2, Susie 22 Not Established mm Hg    HCO3, Venous 26.6 23.0 - 29.0 mmol/L    Base Excess, Susie 1 -3 - 3    O2 Sat, Susie 36 Not Established %    TC02 (Calc), Susie 28 Not Established mmol/L    Sample Type SUSIE     Performed on SEE BELOW    EKG 12 Lead   Result Value Ref Range    Ventricular Rate 56 BPM    Atrial Rate 56 BPM    P-R Interval 132 ms    QRS Duration 76 ms    Q-T Interval 420 ms    QTc Calculation (Bazett) 405 ms    P Axis 60 degrees    R Axis 19 degrees    T Axis 45 degrees    Diagnosis       EKG performed in ER and to be interpreted by ER physician. Confirmed by MD, ER ([id number]),  Brett Bai (JUANCHO), BARBARA ([id number]) on 5/10/2019 7:44:14 AM       RECENT VITALS:  BP: 95/66, Temp: 98.3 °F (36.8 °C), Pulse: 58, Resp: 15, SpO2: 99 %     Procedures     N/A    ED Course     The patient was given the following medications:  No orders of the defined types were placed in this encounter.       CONSULTS:  None    MEDICAL DECISION MAKING / ASSESSMENT / PLAN     Da Light is admitted to the Emergency Department for evaluation of her chief complaint as described in the history of present illness. Complete history and physical was performed by me and my attending. Nursing notes, past medical history, surgical history, family history and social history were reviewed and addressed in the HPI. Nyla Barrios is a 75 y.o. female who presents to the emergency department with a complaint of feeling a seizure \"aura\" and weakness of the left lower extremity. The patient ha complained of feeling like she was going to have a seizure earlier in the day. She also states that her left lower extremity is not functioning. During examination in the ED, the patient subsequently had two, seizure-like episodes with full-body shaking. The patient, however, was able to talk during the episodes, and was lucid immediately after both of them. A chart audit shows an extensive history of seizure investigation with a determination of pseudoseizures. On physical examination, the patient is able to move her left lower extremity, including resisting gravity and lifting it off the bed, despite continuing to persist that she is unable to do so. At times, it appears she is not moving the leg volitionally. Her reflexes are intact and she moves the extremity to painful stimuli. She also inadvertently moves the extremity when distracted through conversation. At the time of turnover, the patient was pending lab work and CT scans ordered by the initial provider. Her CBC, BMP, BNP, Troponin, VBG and INR are all unremarkable and reassuring. Her ECG was nonischemic. Her chest x-ray is unremarkable. Her Head CT is also unremarkable. The patient claims an extensive medical history including multiple strokes and seizures. However, it is difficult to find any supporting diagnostic evidence.  There are numerous notes from several providers in the area who reports concerns for conversion disorder or malingering. Her recent MRIs of the brain show no evidence of stroke and her multiple EEGs show no epileptiform activity concerning for seizures. The patient endorses that she \"does not have epilepsy, but instead a 'special' type of seizures. \" She has reported similar complaints with similar presentations to other EDs in the area, some within the past few days. 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 She was referred to psychiatry but feels insulted by this. In explanation of this, the patient is very defensive and engages in a lot of wandering speech and stories in an effort to justify these previous diagnoses. Her story changes several times and it is difficult to determine exactly what the factual details are. While it is unclear what gain the patient is trying to obtain, there are several notes which suggest the possibility of Munchausen's. Tonight, she is conscious and oriented and no continued seizure-like activity after the initial two events in the ED. Giver her diagnostics and her examination, I don't think the patient's findings are consistent with stroke, epileptiform seizures or other concerning neurologic etiology. She is able to move her left lower extremity and was witnessed bearing weight on it. It is recommended that she follow-up with her primary care physician. I discussed this plan at length the patient who verbalizes understanding and is in agreement. The patient is currently stable and will be discharged home for continued self-care. Please see patient's AVS for additional discharge instructions. The patient was seen and evaluated by myself and the attending physician, Aki Mosquera MD, who agrees with my assessment, treatment and plan. Clinical Impression     1. Nonepileptic episode [facility])        Disposition     PATIENT REFERRED TO:  Gary Hernandez [place] [address] [facility]  [phone]    Schedule an appointment as soon as possible for a visit         DISCHARGE MEDICATIONS:  Discharge Medication List as of 5/9/2019 11:59 PM          DISPOSITION Decision To Discharge 05/09/2019 11:49:39 PM         [address], [place]  05/13/19 1447

## 2019-05-10 VITALS
SYSTOLIC BLOOD PRESSURE: 95 MMHG | OXYGEN SATURATION: 99 % | HEIGHT: 65 IN | BODY MASS INDEX: 26.33 KG/M2 | WEIGHT: 158 LBS | HEART RATE: 58 BPM | RESPIRATION RATE: 15 BRPM | DIASTOLIC BLOOD PRESSURE: 66 MMHG | TEMPERATURE: 98.3 F

## 2019-05-10 LAB
EKG ATRIAL RATE: 56 BPM
EKG DIAGNOSIS: NORMAL
EKG P AXIS: 60 DEGREES
EKG P-R INTERVAL: 132 MS
EKG Q-T INTERVAL: 420 MS
EKG QRS DURATION: 76 MS
EKG QTC CALCULATION (BAZETT): 405 MS
EKG R AXIS: 19 DEGREES
EKG T AXIS: 45 DEGREES
EKG VENTRICULAR RATE: 56 BPM

## 2019-05-10 NOTE — ED NOTES
Pt discharged to home. IV removed, tip intact and pressure applied. Pt given discharge instructions and verbalized understanding. Pt ambulatory at discharge and left without incident.       Gabriela Baez RN  05/10/19 [id number]

## 2019-09-18 ENCOUNTER — APPOINTMENT (OUTPATIENT)
Dept: CT IMAGING | Age: 64
End: 2019-09-18
Payer: MEDICARE

## 2019-09-18 ENCOUNTER — HOSPITAL ENCOUNTER (EMERGENCY)
Age: 64
Discharge: ANOTHER HOSPITAL | End: 2019-09-18
Attending: EMERGENCY MEDICINE
Payer: MEDICARE

## 2019-09-18 ENCOUNTER — APPOINTMENT (OUTPATIENT)
Dept: GENERAL RADIOLOGY | Age: 64
End: 2019-09-18
Payer: MEDICARE

## 2019-09-18 VITALS
OXYGEN SATURATION: 97 % | BODY MASS INDEX: 26.51 KG/M2 | WEIGHT: 159.1 LBS | HEART RATE: 65 BPM | RESPIRATION RATE: 14 BRPM | TEMPERATURE: 97.9 F | HEIGHT: 65 IN | DIASTOLIC BLOOD PRESSURE: 55 MMHG | SYSTOLIC BLOOD PRESSURE: 93 MMHG

## 2019-09-18 DIAGNOSIS — D86.89 NEUROSARCOIDOSIS IN ADULT: ICD-10-CM

## 2019-09-18 DIAGNOSIS — R29.90 STROKE-LIKE SYMPTOMS: Primary | ICD-10-CM

## 2019-09-18 LAB
A/G RATIO: 1.9 (ref 1.1–2.2)
ALBUMIN SERPL-MCNC: 4.6 G/DL (ref 3.4–5)
ALP BLD-CCNC: 40 U/L (ref 40–129)
ALT SERPL-CCNC: 18 U/L (ref 10–40)
ANION GAP SERPL CALCULATED.3IONS-SCNC: 13 MMOL/L (ref 3–16)
AST SERPL-CCNC: 16 U/L (ref 15–37)
BASOPHILS ABSOLUTE: 0 K/UL (ref 0–0.2)
BASOPHILS RELATIVE PERCENT: 0.7 %
BILIRUB SERPL-MCNC: 0.3 MG/DL (ref 0–1)
BILIRUBIN URINE: NEGATIVE
BLOOD, URINE: NEGATIVE
BUN BLDV-MCNC: 14 MG/DL (ref 7–20)
CALCIUM IONIZED: 1.16 MMOL/L (ref 1.12–1.32)
CALCIUM SERPL-MCNC: 9.4 MG/DL (ref 8.3–10.6)
CHLORIDE BLD-SCNC: 103 MMOL/L (ref 99–110)
CLARITY: CLEAR
CO2: 25 MMOL/L (ref 21–32)
CO2: 26 MMOL/L (ref 21–32)
COLOR: YELLOW
CREAT SERPL-MCNC: 0.8 MG/DL (ref 0.6–1.2)
EKG ATRIAL RATE: 67 BPM
EKG DIAGNOSIS: NORMAL
EKG P AXIS: 66 DEGREES
EKG P-R INTERVAL: 132 MS
EKG Q-T INTERVAL: 394 MS
EKG QRS DURATION: 90 MS
EKG QTC CALCULATION (BAZETT): 416 MS
EKG R AXIS: 25 DEGREES
EKG T AXIS: 56 DEGREES
EKG VENTRICULAR RATE: 67 BPM
EOSINOPHILS ABSOLUTE: 0 K/UL (ref 0–0.6)
EOSINOPHILS RELATIVE PERCENT: 0.4 %
GFR AFRICAN AMERICAN: >60
GFR AFRICAN AMERICAN: >60
GFR NON-AFRICAN AMERICAN: >60
GFR NON-AFRICAN AMERICAN: >60
GLOBULIN: 2.4 G/DL
GLUCOSE BLD-MCNC: 108 MG/DL (ref 70–99)
GLUCOSE BLD-MCNC: 111 MG/DL (ref 70–99)
GLUCOSE BLD-MCNC: 111 MG/DL (ref 70–99)
GLUCOSE BLD-MCNC: 89 MG/DL
GLUCOSE BLD-MCNC: 89 MG/DL (ref 70–99)
GLUCOSE URINE: NEGATIVE MG/DL
HCT VFR BLD CALC: 43.2 % (ref 36–48)
HEMOGLOBIN: 14.6 G/DL (ref 12–16)
INR BLD: 1.05 (ref 0.86–1.14)
KETONES, URINE: NEGATIVE MG/DL
LEUKOCYTE ESTERASE, URINE: NEGATIVE
LYMPHOCYTES ABSOLUTE: 1.7 K/UL (ref 1–5.1)
LYMPHOCYTES RELATIVE PERCENT: 35.8 %
MCH RBC QN AUTO: 30.1 PG (ref 26–34)
MCHC RBC AUTO-ENTMCNC: 33.9 G/DL (ref 31–36)
MCV RBC AUTO: 88.9 FL (ref 80–100)
MICROSCOPIC EXAMINATION: NORMAL
MONOCYTES ABSOLUTE: 0.3 K/UL (ref 0–1.3)
MONOCYTES RELATIVE PERCENT: 6.2 %
NEUTROPHILS ABSOLUTE: 2.7 K/UL (ref 1.7–7.7)
NEUTROPHILS RELATIVE PERCENT: 56.9 %
NITRITE, URINE: NEGATIVE
PDW BLD-RTO: 13.5 % (ref 12.4–15.4)
PERFORMED ON: ABNORMAL
PERFORMED ON: ABNORMAL
PERFORMED ON: NORMAL
PH UA: 7 (ref 5–8)
PLATELET # BLD: 263 K/UL (ref 135–450)
PMV BLD AUTO: 7.6 FL (ref 5–10.5)
POC ANION GAP: 10 (ref 10–20)
POC BUN: 15 MG/DL (ref 7–18)
POC CHLORIDE: 105 MMOL/L (ref 99–110)
POC CREATININE: 0.9 MG/DL (ref 0.6–1.2)
POC POTASSIUM: 3.6 MMOL/L (ref 3.5–5.1)
POC SAMPLE TYPE: ABNORMAL
POC SODIUM: 141 MMOL/L (ref 136–145)
POTASSIUM REFLEX MAGNESIUM: 3.9 MMOL/L (ref 3.5–5.1)
PROTEIN UA: NEGATIVE MG/DL
PROTHROMBIN TIME: 12 SEC (ref 9.8–13)
RBC # BLD: 4.86 M/UL (ref 4–5.2)
SODIUM BLD-SCNC: 141 MMOL/L (ref 136–145)
SPECIFIC GRAVITY UA: 1.01 (ref 1–1.03)
TOTAL PROTEIN: 7 G/DL (ref 6.4–8.2)
TROPONIN: <0.01 NG/ML
URINE TYPE: NORMAL
UROBILINOGEN, URINE: 0.2 E.U./DL
WBC # BLD: 4.8 K/UL (ref 4–11)

## 2019-09-18 PROCEDURE — 85610 PROTHROMBIN TIME: CPT

## 2019-09-18 PROCEDURE — 99291 CRITICAL CARE FIRST HOUR: CPT

## 2019-09-18 PROCEDURE — 85025 COMPLETE CBC W/AUTO DIFF WBC: CPT

## 2019-09-18 PROCEDURE — 71045 X-RAY EXAM CHEST 1 VIEW: CPT

## 2019-09-18 PROCEDURE — 84484 ASSAY OF TROPONIN QUANT: CPT

## 2019-09-18 PROCEDURE — 81003 URINALYSIS AUTO W/O SCOPE: CPT

## 2019-09-18 PROCEDURE — 93005 ELECTROCARDIOGRAM TRACING: CPT | Performed by: EMERGENCY MEDICINE

## 2019-09-18 PROCEDURE — 80053 COMPREHEN METABOLIC PANEL: CPT

## 2019-09-18 PROCEDURE — 80047 BASIC METABLC PNL IONIZED CA: CPT

## 2019-09-18 PROCEDURE — 70450 CT HEAD/BRAIN W/O DYE: CPT

## 2019-09-18 PROCEDURE — 6370000000 HC RX 637 (ALT 250 FOR IP): Performed by: EMERGENCY MEDICINE

## 2019-09-18 RX ORDER — DIPHENHYDRAMINE HYDROCHLORIDE 50 MG/ML
25 INJECTION INTRAMUSCULAR; INTRAVENOUS ONCE
Status: DISCONTINUED | OUTPATIENT
Start: 2019-09-18 | End: 2019-09-18

## 2019-09-18 RX ORDER — ASPIRIN 325 MG
325 TABLET ORAL ONCE
Status: COMPLETED | OUTPATIENT
Start: 2019-09-18 | End: 2019-09-18

## 2019-09-18 RX ADMIN — ASPIRIN 325 MG ORAL TABLET 325 MG: 325 PILL ORAL at 20:17

## 2019-09-18 ASSESSMENT — PAIN DESCRIPTION - LOCATION: LOCATION: HIP;LEG

## 2019-09-18 ASSESSMENT — PAIN SCALES - GENERAL: PAINLEVEL_OUTOF10: 4

## 2019-09-18 ASSESSMENT — PAIN DESCRIPTION - ORIENTATION: ORIENTATION: LEFT;UPPER

## 2019-09-18 ASSESSMENT — PAIN DESCRIPTION - DESCRIPTORS: DESCRIPTORS: ACHING

## 2020-10-04 ENCOUNTER — HOSPITAL ENCOUNTER (EMERGENCY)
Age: 65
Discharge: HOME | End: 2020-10-04
Attending: EMERGENCY MEDICINE
Payer: MEDICARE

## 2020-10-04 ENCOUNTER — APPOINTMENT (OUTPATIENT)
Dept: GENERAL RADIOLOGY | Age: 65
End: 2020-10-04
Payer: MEDICARE

## 2020-10-04 VITALS
RESPIRATION RATE: 16 BRPM | BODY MASS INDEX: 26.63 KG/M2 | WEIGHT: 160 LBS | TEMPERATURE: 99.7 F | HEART RATE: 77 BPM | OXYGEN SATURATION: 98 % | SYSTOLIC BLOOD PRESSURE: 102 MMHG | DIASTOLIC BLOOD PRESSURE: 62 MMHG

## 2020-10-04 LAB
ALBUMIN SERPL-MCNC: 4.2 G/DL (ref 3.4–5)
ALP BLD-CCNC: 39 U/L (ref 40–129)
ALT SERPL-CCNC: 28 U/L (ref 10–40)
ANION GAP SERPL CALCULATED.3IONS-SCNC: 12 MMOL/L (ref 3–16)
AST SERPL-CCNC: 26 U/L (ref 15–37)
BASOPHILS ABSOLUTE: 0 K/UL (ref 0–0.2)
BASOPHILS RELATIVE PERCENT: 0.2 %
BILIRUB SERPL-MCNC: 0.4 MG/DL (ref 0–1)
BILIRUBIN DIRECT: <0.2 MG/DL (ref 0–0.3)
BILIRUBIN URINE: NEGATIVE
BILIRUBIN, INDIRECT: ABNORMAL MG/DL (ref 0–1)
BLOOD, URINE: NEGATIVE
BUN BLDV-MCNC: 13 MG/DL (ref 7–20)
CALCIUM SERPL-MCNC: 9.1 MG/DL (ref 8.3–10.6)
CHLORIDE BLD-SCNC: 100 MMOL/L (ref 99–110)
CLARITY: CLEAR
CO2: 24 MMOL/L (ref 21–32)
COLOR: YELLOW
CREAT SERPL-MCNC: 0.7 MG/DL (ref 0.6–1.2)
EOSINOPHILS ABSOLUTE: 0 K/UL (ref 0–0.6)
EOSINOPHILS RELATIVE PERCENT: 0 %
GFR AFRICAN AMERICAN: >60
GFR NON-AFRICAN AMERICAN: >60
GLUCOSE BLD-MCNC: 111 MG/DL (ref 70–99)
GLUCOSE URINE: NEGATIVE MG/DL
HCT VFR BLD CALC: 40.5 % (ref 36–48)
HEMOGLOBIN: 13.7 G/DL (ref 12–16)
KETONES, URINE: NEGATIVE MG/DL
LEUKOCYTE ESTERASE, URINE: NEGATIVE
LIPASE: 27 U/L (ref 13–60)
LYMPHOCYTES ABSOLUTE: 0.7 K/UL (ref 1–5.1)
LYMPHOCYTES RELATIVE PERCENT: 3.5 %
MCH RBC QN AUTO: 29.6 PG (ref 26–34)
MCHC RBC AUTO-ENTMCNC: 33.8 G/DL (ref 31–36)
MCV RBC AUTO: 87.6 FL (ref 80–100)
MICROSCOPIC EXAMINATION: NORMAL
MONOCYTES ABSOLUTE: 0.5 K/UL (ref 0–1.3)
MONOCYTES RELATIVE PERCENT: 2.5 %
NEUTROPHILS ABSOLUTE: 17.6 K/UL (ref 1.7–7.7)
NEUTROPHILS RELATIVE PERCENT: 93.8 %
NITRITE, URINE: NEGATIVE
PDW BLD-RTO: 13.5 % (ref 12.4–15.4)
PH UA: 7.5 (ref 5–8)
PLATELET # BLD: 244 K/UL (ref 135–450)
PMV BLD AUTO: 7.3 FL (ref 5–10.5)
POTASSIUM REFLEX MAGNESIUM: 4.3 MMOL/L (ref 3.5–5.1)
PROTEIN UA: NEGATIVE MG/DL
RBC # BLD: 4.62 M/UL (ref 4–5.2)
SODIUM BLD-SCNC: 136 MMOL/L (ref 136–145)
SPECIFIC GRAVITY UA: 1.01 (ref 1–1.03)
TOTAL PROTEIN: 7 G/DL (ref 6.4–8.2)
TROPONIN: <0.01 NG/ML
URINE TYPE: NORMAL
UROBILINOGEN, URINE: 0.2 E.U./DL
WBC # BLD: 18.8 K/UL (ref 4–11)

## 2020-10-04 PROCEDURE — 80076 HEPATIC FUNCTION PANEL: CPT

## 2020-10-04 PROCEDURE — 93005 ELECTROCARDIOGRAM TRACING: CPT | Performed by: STUDENT IN AN ORGANIZED HEALTH CARE EDUCATION/TRAINING PROGRAM

## 2020-10-04 PROCEDURE — 99284 EMERGENCY DEPT VISIT MOD MDM: CPT

## 2020-10-04 PROCEDURE — 83690 ASSAY OF LIPASE: CPT

## 2020-10-04 PROCEDURE — 85025 COMPLETE CBC W/AUTO DIFF WBC: CPT

## 2020-10-04 PROCEDURE — 36415 COLL VENOUS BLD VENIPUNCTURE: CPT

## 2020-10-04 PROCEDURE — 71045 X-RAY EXAM CHEST 1 VIEW: CPT

## 2020-10-04 PROCEDURE — 84484 ASSAY OF TROPONIN QUANT: CPT

## 2020-10-04 PROCEDURE — 81003 URINALYSIS AUTO W/O SCOPE: CPT

## 2020-10-04 PROCEDURE — 6370000000 HC RX 637 (ALT 250 FOR IP): Performed by: STUDENT IN AN ORGANIZED HEALTH CARE EDUCATION/TRAINING PROGRAM

## 2020-10-04 PROCEDURE — 80048 BASIC METABOLIC PNL TOTAL CA: CPT

## 2020-10-04 RX ORDER — FAMOTIDINE 20 MG/1
20 TABLET, FILM COATED ORAL 2 TIMES DAILY
Qty: 180 TABLET | Refills: 1 | Status: SHIPPED | OUTPATIENT
Start: 2020-10-04

## 2020-10-04 RX ADMIN — LIDOCAINE HYDROCHLORIDE: 20 SOLUTION ORAL; TOPICAL at 12:20

## 2020-10-04 ASSESSMENT — PAIN DESCRIPTION - LOCATION: LOCATION: ABDOMEN

## 2020-10-04 ASSESSMENT — PAIN DESCRIPTION - PAIN TYPE: TYPE: ACUTE PAIN

## 2020-10-04 ASSESSMENT — PAIN DESCRIPTION - DESCRIPTORS: DESCRIPTORS: ACHING

## 2020-10-04 ASSESSMENT — PAIN SCALES - GENERAL: PAINLEVEL_OUTOF10: 7

## 2020-10-04 NOTE — ED TRIAGE NOTES
Date of service: 8/15/2018    Procedure: Nerve Conduction Study and Electromyography - complete EMG study in right upper extremity. History: Electrodiagnostic testing on this 41year old male was performed in bilateral right upper extremity.  The patient Pt with abdominal pain and vomiting, nausea, 7/0 pain, hx of GI problems and multiple allergies to meds, seen at Urgent care and referred, hx of stroke GI cancer

## 2020-10-04 NOTE — ED NOTES
Bed: A04-04  Expected date:   Expected time:   Means of arrival:   Comments:  gino Johnson RN  10/04/20 1118

## 2020-10-04 NOTE — ED PROVIDER NOTES
ED Attending Attestation Note     Date of evaluation: 10/4/2020    This patient was seen by the resident, Dr. Carey Denny. I have seen and examined the patient, agree with the workup, evaluation, management and diagnosis. The care plan has been discussed. I have reviewed the ECG and concur with the resident's interpretation. This is a pleasant 47-year-old female with a past medical history of hypertension and several psych disorders that presents today for evaluation of abdominal pain. Patient states that yesterday she went to [employer] to get some chicken for her dog but she also ate this early in the afternoon. She states that she woke up this morning with some epigastric abdominal pain. It was associated with some nausea and vomiting that was nonbloody and nonbilious. She has had no diarrhea. States that she has a history of strokes and sometimes is unable to feel she has some burning with urination. She reports that she has multiple abdominal surgeries including appendectomy, cholecystectomy, oophorectomy, hysterectomy, and a fibroid tumor removed. On exam, the patient is well-appearing, no acute distress. Her skin is pale. She has a regular rate and rhythm, lung sounds are clear and equal bilaterally. Belly is soft, nontender, nondistended. Normoactive bowel sounds with no CVA tenderness. No edema. Patient able to ambulate to the bathroom without assistance. EKG, performed at 1350, shows a normal sinus rhythm at a rate of 68 with normal axis, intervals are normal and are as follows: DE = 146, QRS = 70, QTC = 446. No ST segment elevations or depressions. Normal EKG. Labs are reviewed, notable for leukocytosis, but kidney and liver function test including lipase within normal limits. Urinalysis pending. Patient states that she has improvement in her symptoms after GI cocktail. Patient is being given applesauce for p.o. challenge.     At this point, my suspicion for acute abdomen is low and will defer imaging at this time. Please see resident note for reassessments and final disposition.      Parish Pacheco MD  10/04/20 7579

## 2020-10-04 NOTE — ED PROVIDER NOTES
[address]          EM RESIDENT NOTE       Date of evaluation: 10/4/2020    Chief Complaint     Abdominal Pain and Fever      History of Present Illness     Jam Shaikh is a 77 y.o. female who presents with chief complaint of epigastric abdominal pain. This is a 67-year-old female with history of Münchhausen's syndrome, sarcoidosis with neurosarcoid and cardiac involvement, history of seizure disorder, history of multiple strokes, history of hypothyroidism, history of reported but undocumented gallbladder cancer status post cholecystectomy (2018). Patient reports that she awoke yesterday morning with nausea, without abdominal pain or vomiting that resolved on its own. Patient reports then awaking this morning with recurrence of nausea and 2 episodes of small volume emesis, as well as epigastric abdominal pain. Patient reports taking her temperature, which was 99.0, which is elevated from her baseline of 97.2-97.5, took her heart rate which was slightly elevated, and her pulse ox was 95 to 97% on room air, and her blood pressure was slightly elevated for her. Patient was concerned with the symptoms, presented to an urgent care for evaluation, at which time they referred her to the emergency department with reported concern for pancreatitis versus perforated bowel. Patient reports that she has a history of gastritis, which typically responds to GI cocktails. Patient reports tolerating food and drink by mouth without difficulty until this morning. Patient denies weight loss, changes in bowel or bladder habits, alcohol or recreational drug use. Review of Systems     All other systems reviewed and are negative except as mentioned in HPI.     Past Medical, Surgical, Family, and Social History     She has a past medical history of Arrhythmia, Asthma, Atrial fibrillation ([address] CAD (coronary artery disease), Gastroparesis, Hypertension, PAF (paroxysmal atrial fibrillation) ([facility] Psychiatric disorder, Seizures ([facility] [address] [facility] Sick sinus syndrome ([facility] [address] Tachy-jazmin syndrome ([facility] [address] and Unspecified cerebral artery occlusion with cerebral infarction. She has a past surgical history that includes Appendectomy; Hysterectomy; and Breast surgery. Her family history includes Alzheimer's Disease in her mother; Diabetes in her father and mother; High Blood Pressure in her mother. She reports that she has never smoked. She has never used smokeless tobacco. She reports that she does not drink alcohol or use drugs.     Medications     Discharge Medication List as of 10/4/2020  3:09 PM      CONTINUE these medications which have NOT CHANGED    Details   albuterol sulfate  (90 Base) MCG/ACT inhaler Inhale 2 puffs into the lungsHistorical Med      hydrochlorothiazide (HYDRODIURIL) 12.5 MG tablet Take 12.5 mg by mouth daily as needed Historical Med      Multiple Vitamin (MULTIVITAMIN) tablet Take 1 tablet by mouthHistorical Med      nystatin (MYCOSTATIN) 554165 UNIT/ML suspension 500,000 Units, Starting Wed 6/27/2018, Historical Med      acetaminophen (TYLENOL) 325 MG tablet Take 2 tablets by mouth every 6 hours as needed for Pain, Disp-120 tablet, R-3OTC      fluticasone (FLONASE) 50 MCG/ACT nasal spray 1 spray by Nasal route daily, Disp-1 Bottle, R-0Print      vitamin D (CHOLECALCIFEROL) 1000 UNIT TABS tablet Take 1,000 Units by mouth dailyHistorical Med      vitamin B-12 (CYANOCOBALAMIN) 100 MCG tablet Take 50 mcg by mouth dailyHistorical Med      pantoprazole (PROTONIX) 20 MG tablet Take 1 tablet by mouth 2 times daily, Disp-60 tablet, R-0Print      fluticasone (FLOVENT HFA) 220 MCG/ACT inhaler Inhale 1 puff into the lungs 2 times dailyHistorical Med      levothyroxine (SYNTHROID) 75 MCG tablet Take 75 mcg by mouth DailyHistorical Med      aspirin EC 81 MG EC tablet Take 81 mg by mouth daily      escitalopram (LEXAPRO) 10 MG tablet Take 5 mg by mouth nightly Historical Med diazepam (VALIUM) 5 MG tablet Take 5 mg by mouth every 12 hours as needed Historical Med             Allergies     She is allergic to ciprofloxacin; phenytoin; demerol; iodides; penicillins; vancomycin; avelox [moxifloxacin hydrochloride]; betamethasone; clarithromycin; dye [barium-containing compounds]; gadolinium; hydrocodone; hydromorphone; ibuprofen; influenza a (h1n1) monoval vac; phenytoin sodium extended; pneumococcal vaccine; quinolones; shellfish-derived products; sulfa antibiotics; tetanus toxoids; alprazolam; codeine; meperidine; and morphine. Physical Exam     INITIAL VITALS: BP: (!) 140/65, Temp: 99.7 °F (37.6 °C), Pulse: 77, Resp: 16, SpO2: 98 %     General:  Well appearing, non-toxic. Appears stated age. No acute distress  Eyes:  Pupils equal, round, and reactive to light. No discharge from eyes   ENT:  No discharge from nose. Oropharynx clear. Neck:  Supple, trachea midline  Pulmonary:   Non-labored breathing. Breath sounds clear bilaterally  Cardiac:  Regular rate and rhythm. No murmurs  Abdomen:  Soft. Non-tender. Non-distended  Musculoskeletal:  No long bone deformity. Vascular:  Extremities warm and perfused. Normal pulses in all 4 extremities. Skin:  Dry, no rashes, no bruising  Extremities:  No peripheral edema  Neuro:  Alert. Moves all four extremities to command. No focal deficit  Psych: Appropriate mood and affect to the clinical situation     DiagnosticResults     RADIOLOGY:  XR CHEST PORTABLE   Final Result   Impression:   1. No airspace disease.           LABS:   Results for orders placed or performed during the hospital encounter of 10/04/20   CBC Auto Differential   Result Value Ref Range    WBC 18.8 (H) 4.0 - 11.0 K/uL    RBC 4.62 4.00 - 5.20 M/uL    Hemoglobin 13.7 12.0 - 16.0 g/dL    Hematocrit 40.5 36.0 - 48.0 %    MCV 87.6 80.0 - 100.0 fL    MCH 29.6 26.0 - 34.0 pg    MCHC 33.8 31.0 - 36.0 g/dL    RDW 13.5 12.4 - 15.4 %    Platelets 286 977 - 837 K/uL    MPV 7.3 5.0 - 10.5 fL    Neutrophils % 93.8 %    Lymphocytes % 3.5 %    Monocytes % 2.5 %    Eosinophils % 0.0 %    Basophils % 0.2 %    Neutrophils Absolute 17.6 (H) 1.7 - 7.7 K/uL    Lymphocytes Absolute 0.7 (L) 1.0 - 5.1 K/uL    Monocytes Absolute 0.5 0.0 - 1.3 K/uL    Eosinophils Absolute 0.0 0.0 - 0.6 K/uL    Basophils Absolute 0.0 0.0 - 0.2 K/uL   Basic Metabolic Panel w/ Reflex to MG   Result Value Ref Range    Sodium 136 136 - 145 mmol/L    Potassium reflex Magnesium 4.3 3.5 - 5.1 mmol/L    Chloride 100 99 - 110 mmol/L    CO2 24 21 - 32 mmol/L    Anion Gap 12 3 - 16    Glucose 111 (H) 70 - 99 mg/dL    BUN 13 7 - 20 mg/dL    CREATININE 0.7 0.6 - 1.2 mg/dL    GFR Non-African American >60 >60    GFR African American >60 >60    Calcium 9.1 8.3 - 10.6 mg/dL   Hepatic Function Panel   Result Value Ref Range    Total Protein 7.0 6.4 - 8.2 g/dL    Alb 4.2 3.4 - 5.0 g/dL    Alkaline Phosphatase 39 (L) 40 - 129 U/L    ALT 28 10 - 40 U/L    AST 26 15 - 37 U/L    Total Bilirubin 0.4 0.0 - 1.0 mg/dL    Bilirubin, Direct <0.2 0.0 - 0.3 mg/dL    Bilirubin, Indirect see below 0.0 - 1.0 mg/dL   Lipase   Result Value Ref Range    Lipase 27.0 13.0 - 60.0 U/L   Urinalysis, reflex to microscopic (Lab)   Result Value Ref Range    Color, UA Yellow Straw/Yellow    Clarity, UA Clear Clear    Glucose, Ur Negative Negative mg/dL    Bilirubin Urine Negative Negative    Ketones, Urine Negative Negative mg/dL    Specific Gravity, UA 1.010 1.005 - 1.030    Blood, Urine Negative Negative    pH, UA 7.5 5.0 - 8.0    Protein, UA Negative Negative mg/dL    Urobilinogen, Urine 0.2 <2.0 E.U./dL    Nitrite, Urine Negative Negative    Leukocyte Esterase, Urine Negative Negative    Microscopic Examination Not Indicated     Urine Type Voided    Troponin (lab)   Result Value Ref Range    Troponin <0.01 <0.01 ng/mL       RECENT VITALS:  BP: 102/62, Temp: 99.7 °F (37.6 °C), Pulse: 77,Resp: 16, SpO2: 98 %     EKG: normal EKG, normal sinus rhythm.  Normal intervals, No ST deviation or TWI concerning for ischemia. Axis normal.       Procedures     None    ED Course     Nursing Notes, Past Medical Hx, Past Surgical Hx, Social Hx, Allergies, and Family Hx were reviewed. The patient was given the followingmedications:  Orders Placed This Encounter   Medications    aluminum & magnesium hydroxide-simethicone (MAALOX) 30 mL, lidocaine viscous hcl (XYLOCAINE) 5 mL (GI COCKTAIL)    famotidine (PEPCID) 20 MG tablet     Sig: Take 1 tablet by mouth 2 times daily     Dispense:  180 tablet     Refill:  1       CONSULTS:  None    MEDICAL DECISION MAKING / ASSESSMENT / PLAN     Michael Klein is a 77 y.o. female presenting with a chief complaint of abdominal pain. Patient's physical exam demonstrates evidence of a soft, nondistended abdomen with distractible tenderness to palpation. Differential diagnosis for this pain includes pancreatitis, hepatitis, gastritis, peptic ulcer disease. Low suspicion for perforated ulcer, perforated viscus, mesenteric ischemia given this patient's presentation and exam.  Patient had an upright chest x-ray without demonstration of free peritoneal air. LFTs and lipase within normal limits, renal panel overall unremarkable. Patient does demonstrate leukocytosis to 18, however this is a nonspecific indicator, and may be elevated secondary to her vomiting alone, and there are no other signs of infection on exam or work-up. Atypical acute coronary syndrome was evaluated with an EKG, chest x-ray, troponin, all of which were within normal limits. Patient was given a GI cocktail with reported improvement in her symptoms. Suspect gastritis versus gastroesophageal reflux disease as cause of this patient's presentation this morning. This patient was also evaluated by the attending physician. All care plans were discussed and agreed upon. Clinical Impression     1.  Other acute gastritis without hemorrhage        Disposition     PATIENT REFERRED TO:  Tierney Wyman [address]  [phone]    In 1 week  If symptoms worsen      DISCHARGE MEDICATIONS:  Discharge Medication List as of 10/4/2020  3:09 PM      START taking these medications    Details   famotidine (PEPCID) 20 MG tablet Take 1 tablet by mouth 2 times daily, Disp-180 tablet,R-1Print             DISPOSITION Decision To Discharge 10/04/2020 02:50:30 PM          Chiquis Spaulding MD  Resident  10/04/20 [id number]

## 2020-10-05 LAB
EKG ATRIAL RATE: 68 BPM
EKG DIAGNOSIS: NORMAL
EKG P AXIS: 60 DEGREES
EKG P-R INTERVAL: 146 MS
EKG Q-T INTERVAL: 420 MS
EKG QRS DURATION: 78 MS
EKG QTC CALCULATION (BAZETT): 446 MS
EKG R AXIS: 14 DEGREES
EKG T AXIS: 42 DEGREES
EKG VENTRICULAR RATE: 68 BPM

## 2021-01-11 ENCOUNTER — HOSPITAL ENCOUNTER (EMERGENCY)
Age: 66
Discharge: HOME | End: 2021-01-11
Attending: EMERGENCY MEDICINE
Payer: MEDICARE

## 2021-01-11 ENCOUNTER — APPOINTMENT (OUTPATIENT)
Dept: MRI IMAGING | Age: 66
End: 2021-01-11
Payer: MEDICARE

## 2021-01-11 ENCOUNTER — APPOINTMENT (OUTPATIENT)
Dept: CT IMAGING | Age: 66
End: 2021-01-11
Payer: MEDICARE

## 2021-01-11 VITALS
RESPIRATION RATE: 14 BRPM | TEMPERATURE: 98.3 F | DIASTOLIC BLOOD PRESSURE: 64 MMHG | OXYGEN SATURATION: 99 % | SYSTOLIC BLOOD PRESSURE: 113 MMHG | HEART RATE: 56 BPM

## 2021-01-11 DIAGNOSIS — R53.1 LEFT-SIDED WEAKNESS: Primary | ICD-10-CM

## 2021-01-11 LAB
ANION GAP SERPL CALCULATED.3IONS-SCNC: 8 MMOL/L (ref 3–16)
BASOPHILS ABSOLUTE: 0 K/UL (ref 0–0.2)
BASOPHILS RELATIVE PERCENT: 0.8 %
BUN BLDV-MCNC: 15 MG/DL (ref 7–20)
CALCIUM SERPL-MCNC: 9.3 MG/DL (ref 8.3–10.6)
CHLORIDE BLD-SCNC: 104 MMOL/L (ref 99–110)
CO2: 28 MMOL/L (ref 21–32)
CREAT SERPL-MCNC: 0.8 MG/DL (ref 0.6–1.2)
EKG ATRIAL RATE: 59 BPM
EKG DIAGNOSIS: NORMAL
EKG P AXIS: 56 DEGREES
EKG P-R INTERVAL: 146 MS
EKG Q-T INTERVAL: 434 MS
EKG QRS DURATION: 82 MS
EKG QTC CALCULATION (BAZETT): 429 MS
EKG R AXIS: 16 DEGREES
EKG T AXIS: 43 DEGREES
EKG VENTRICULAR RATE: 59 BPM
EOSINOPHILS ABSOLUTE: 0 K/UL (ref 0–0.6)
EOSINOPHILS RELATIVE PERCENT: 0.5 %
GFR AFRICAN AMERICAN: >60
GFR NON-AFRICAN AMERICAN: >60
GLUCOSE BLD-MCNC: 103 MG/DL (ref 70–99)
GLUCOSE BLD-MCNC: 98 MG/DL (ref 70–99)
HCT VFR BLD CALC: 42.6 % (ref 36–48)
HEMOGLOBIN: 14.2 G/DL (ref 12–16)
LYMPHOCYTES ABSOLUTE: 2.2 K/UL (ref 1–5.1)
LYMPHOCYTES RELATIVE PERCENT: 37.7 %
MAGNESIUM: 2.1 MG/DL (ref 1.8–2.4)
MCH RBC QN AUTO: 29.7 PG (ref 26–34)
MCHC RBC AUTO-ENTMCNC: 33.3 G/DL (ref 31–36)
MCV RBC AUTO: 89.2 FL (ref 80–100)
MONOCYTES ABSOLUTE: 0.5 K/UL (ref 0–1.3)
MONOCYTES RELATIVE PERCENT: 7.8 %
NEUTROPHILS ABSOLUTE: 3.2 K/UL (ref 1.7–7.7)
NEUTROPHILS RELATIVE PERCENT: 53.2 %
PDW BLD-RTO: 13.1 % (ref 12.4–15.4)
PERFORMED ON: ABNORMAL
PHOSPHORUS: 3.6 MG/DL (ref 2.5–4.9)
PLATELET # BLD: 252 K/UL (ref 135–450)
PMV BLD AUTO: 7.7 FL (ref 5–10.5)
POTASSIUM SERPL-SCNC: 3.7 MMOL/L (ref 3.5–5.1)
RBC # BLD: 4.77 M/UL (ref 4–5.2)
SODIUM BLD-SCNC: 140 MMOL/L (ref 136–145)
TROPONIN: <0.01 NG/ML
WBC # BLD: 6 K/UL (ref 4–11)

## 2021-01-11 PROCEDURE — 84484 ASSAY OF TROPONIN QUANT: CPT

## 2021-01-11 PROCEDURE — 70551 MRI BRAIN STEM W/O DYE: CPT

## 2021-01-11 PROCEDURE — 70450 CT HEAD/BRAIN W/O DYE: CPT

## 2021-01-11 PROCEDURE — 85025 COMPLETE CBC W/AUTO DIFF WBC: CPT

## 2021-01-11 PROCEDURE — 80048 BASIC METABOLIC PNL TOTAL CA: CPT

## 2021-01-11 PROCEDURE — 84100 ASSAY OF PHOSPHORUS: CPT

## 2021-01-11 PROCEDURE — 93005 ELECTROCARDIOGRAM TRACING: CPT | Performed by: EMERGENCY MEDICINE

## 2021-01-11 PROCEDURE — 83735 ASSAY OF MAGNESIUM: CPT

## 2021-01-11 PROCEDURE — 99283 EMERGENCY DEPT VISIT LOW MDM: CPT

## 2021-01-11 NOTE — ED TRIAGE NOTES
Pt arrived to ED with weakness and fatigue since 1000, reports hx of 4 prior strokes. MD at [facility], code stroke called, sent to CT scan. . PIV started and labs obtained. EKG completed.   Will continue to monitor

## 2021-01-11 NOTE — CONSULTS
Neurology consult Note    Dr. Gricelda Durbin requesting this consult. CC: left sided weakness    HPI:     Ms. Lydia Mohr is a 77year old woman who carries many different diagnoses on chart review. Of note, she does have a significant history of psychiatric disorder and Munchausen's disorder and psychogenic, non-epileptic spells. She has self reported atrial fib, cancer, seizures, neuro-sarcoidosis, but no specialist notes or testing to support these claims. She had EMU admission in 2010 and was diagnosed with PNES. She reports that she has neuro-sarcoidosis but her MRIs from 2013, 2016, 2018, and 2019 have all been unremarkable. Past Medical History:   Diagnosis Date    Arrhythmia     Asthma     Atrial fibrillation ([address]     CAD (coronary artery disease)     Gastroparesis     Hypertension     PAF (paroxysmal atrial fibrillation) ([address] 6/6/2018    Patient reported; no concrete evidence    Psychiatric disorder     Seizures ([address]     Sick sinus syndrome ([address]     Tachy-jazmin syndrome ([address]     Unspecified cerebral artery occlusion with cerebral infarction      Past Surgical History:   Procedure Laterality Date    APPENDECTOMY      BREAST SURGERY      HYSTERECTOMY         CURRENT MEDICATIONS:  No current facility-administered medications for this encounter.      Current Outpatient Medications:     famotidine (PEPCID) 20 MG tablet, Take 1 tablet by mouth 2 times daily, Disp: 180 tablet, Rfl: 1    albuterol sulfate  (90 Base) MCG/ACT inhaler, Inhale 2 puffs into the lungs, Disp: , Rfl:     hydrochlorothiazide (HYDRODIURIL) 12.5 MG tablet, Take 12.5 mg by mouth daily as needed , Disp: , Rfl:     Multiple Vitamin (MULTIVITAMIN) tablet, Take 1 tablet by mouth, Disp: , Rfl:     nystatin (MYCOSTATIN) 467368 UNIT/ML suspension, 500,000 Units, Disp: , Rfl:     acetaminophen (TYLENOL) 325 MG tablet, Take 2 tablets by mouth every 6 hours as needed for Pain, Disp: 120 tablet, Rfl: 3    fluticasone (FLONASE) 50 MCG/ACT nasal spray, 1 spray by Nasal route daily, Disp: 1 Bottle, Rfl: 0    vitamin D (CHOLECALCIFEROL) 1000 UNIT TABS tablet, Take 1,000 Units by mouth daily, Disp: , Rfl:     vitamin B-12 (CYANOCOBALAMIN) 100 MCG tablet, Take 50 mcg by mouth daily, Disp: , Rfl:     pantoprazole (PROTONIX) 20 MG tablet, Take 1 tablet by mouth 2 times daily, Disp: 60 tablet, Rfl: 0    fluticasone (FLOVENT HFA) 220 MCG/ACT inhaler, Inhale 1 puff into the lungs 2 times daily, Disp: , Rfl:     levothyroxine (SYNTHROID) 75 MCG tablet, Take 75 mcg by mouth Daily, Disp: , Rfl:     aspirin EC 81 MG EC tablet, Take 81 mg by mouth daily, Disp: , Rfl:     escitalopram (LEXAPRO) 10 MG tablet, Take 5 mg by mouth nightly , Disp: , Rfl:     diazepam (VALIUM) 5 MG tablet, Take 5 mg by mouth every 12 hours as needed , Disp: , Rfl:       ROS:   Constitutional- No weight loss or + fevers   Eyes- No diplopia. No photophobia. Ears/nose/throat- No dysphagia. No Dysarthria   Cardiovascular- No palpitations. No chest pain   Respiratory- No dyspnea. No Cough   Gastrointestinal- No Abdominal pain. No Vomiting. Genitourinary- No incontinence. No urinary retention   Musculoskeletal- + myalgia. No arthralgia   Skin- No rash. No easy bruising. Psychiatric- No depression. No anxiety   Endocrine- No diabetes. No thyroid issues. Hematologic- No bleeding difficulty. No fatigue   Neurologic- + weakness. No Headache. Constitutional  BP (!) 155/68   Pulse 59   Temp 98.3 °F (36.8 °C) (Oral)   Resp 16   SpO2 100%     General Alert, no distress, well-nourished  Cardiovascular: Rate regular.  No murmurs  Respiratory: No adventitious breath sounds    Neurologic  Mental status:   orientation to person, place, time, situation   Attention intact as able to attend well to the exam     Language fluent in conversation   Comprehension intact; follows simple commands    Cranial nerves:   CN2: Visual Fields full w/o extinction on confrontational testing  CN 3,4,6: pupils equal and reactive to light, extraocular muscles intact,  CN5: facial sensation symmetric   CN7:face symmetric  CN8: hearing symmetric to voice  CN9: palate elevated symmetrically  CN11: trap full strength on shoulder shrug  CN12: tongue midline with protrusion  Motor Exam:  Left arm and leg with give-way weakness    Sensory: light touch intact and symmetric in all 4 extremities. No sensory extinction on double simultaneous stimulation  Cerebellar/coordination: finger nose finger normal without ataxia  Tone: normal in all 4 extremities        Images:  CT head wo contrast:   NO ACUTE INTRACRANIAL ABNORMALITY. Assessment: 77year old with unclear medical history but some factious disease in the past presents with left arm and leg weakness. Her physical exam is inconsistent with true motor weakness.        Plan:  -No further inpatient neurologic workup      CAREMN [facility]  Neurology  [phone]

## 2021-01-11 NOTE — ED PROVIDER NOTES
[address] [facility]          ATTENDING PHYSICIAN NOTE       Date of evaluation: 1/11/2021    Chief Complaint     Fatigue      History of Present Illness     Vibha Spears is a 77 y.o. female who presents to the emergency department with complaint of left-sided weakness. The patient has a prior history of previous stroke, seizures, somatization disorder who presents emergency department with a sudden onset of left-sided weakness that started approximately 10 AM this morning. The patient reports that she was sitting in a chair when she began to feel a numbness type sensation on her left face. That progressed then to be a weakness of the left face arm and leg. Reports similar symptoms in the past.  She states that she had otherwise been in her usual state of health over the course the past several days. Denies any fevers chills nausea vomiting chest pain shortness of breath abdominal pain. Review of Systems     As documented in the HPI, otherwise all other systems were reviewed and were negative. Past Medical, Surgical, Family, and Social History     She has a past medical history of Arrhythmia, Asthma, Atrial fibrillation ([address] CAD (coronary artery disease), Gastroparesis, Hypertension, PAF (paroxysmal atrial fibrillation) ([address] Psychiatric disorder, Seizures ([address] Sick sinus syndrome ([address] Tachy-jazmin syndrome ([address] and Unspecified cerebral artery occlusion with cerebral infarction. She has a past surgical history that includes Appendectomy; Hysterectomy; and Breast surgery. Her family history includes Alzheimer's Disease in her mother; Diabetes in her father and mother; High Blood Pressure in her mother. She reports that she has never smoked. She has never used smokeless tobacco. She reports that she does not drink alcohol or use drugs.     Medications     Previous Medications    ACETAMINOPHEN (TYLENOL) 325 MG TABLET    Take 2 tablets by mouth every 6 hours as needed for Pain    ALBUTEROL SULFATE  (90 BASE) MCG/ACT INHALER    Inhale 2 puffs into the lungs    ASPIRIN EC 81 MG EC TABLET    Take 81 mg by mouth daily    DIAZEPAM (VALIUM) 5 MG TABLET    Take 5 mg by mouth every 12 hours as needed     ESCITALOPRAM (LEXAPRO) 10 MG TABLET    Take 5 mg by mouth nightly     FAMOTIDINE (PEPCID) 20 MG TABLET    Take 1 tablet by mouth 2 times daily    FLUTICASONE (FLONASE) 50 MCG/ACT NASAL SPRAY    1 spray by Nasal route daily    FLUTICASONE (FLOVENT HFA) 220 MCG/ACT INHALER    Inhale 1 puff into the lungs 2 times daily    HYDROCHLOROTHIAZIDE (HYDRODIURIL) 12.5 MG TABLET    Take 12.5 mg by mouth daily as needed     LEVOTHYROXINE (SYNTHROID) 75 MCG TABLET    Take 75 mcg by mouth Daily    MULTIPLE VITAMIN (MULTIVITAMIN) TABLET    Take 1 tablet by mouth    NYSTATIN (MYCOSTATIN) 829842 UNIT/ML SUSPENSION    500,000 Units    PANTOPRAZOLE (PROTONIX) 20 MG TABLET    Take 1 tablet by mouth 2 times daily    VITAMIN B-12 (CYANOCOBALAMIN) 100 MCG TABLET    Take 50 mcg by mouth daily    VITAMIN D (CHOLECALCIFEROL) 1000 UNIT TABS TABLET    Take 1,000 Units by mouth daily       Allergies     She is allergic to ciprofloxacin; phenytoin; demerol; iodides; penicillins; vancomycin; avelox [moxifloxacin hydrochloride]; betamethasone; clarithromycin; dye [barium-containing compounds]; gadolinium; hydrocodone; hydromorphone; ibuprofen; influenza a (h1n1) monoval vac; phenytoin sodium extended; pneumococcal vaccine; quinolones; shellfish-derived products; sulfa antibiotics; tetanus toxoids; alprazolam; codeine; meperidine; and morphine.     Physical Exam     INITIAL VITALS: BP: (!) 155/68, Temp: 98.3 °F (36.8 °C), Pulse: 59, Resp: 16, SpO2: 100 %   General: 13-year-old female sitting in bed no apparent cardiorespiratory distress  HEENT:  head is atraumatic, pupils equal round and reactive to light, sclera are clear, oropharynx is nonerythematous  Neck: supple, no lymphadenopathy  Chest: nonlabored respirations, equal chest rise bilaterally, no accessory muscle use  Cardiovascular: Regular, rate, and rhythm, 2+ radial pulses bilaterally, capillary refill 2 seconds  Abdominal: Soft, nontender, nondistended, positive bowel sounds throughout, no rebound or guarding  Skin: Warm, dry well perfused, no rashes  Musculoskeletal: no obvious deformities, no tenderness to palpation diffusely  Neurologic:  NIHSS - 3, alert and oriented x4 the patient has some possible mild left facial weakness and left lower extremity weakness however the patient has a largely functional exam.  I observe the patient to spontaneously puff out her cheeks without obvious weakness and then when asked to do so demonstrated some weakness on the left corner of the mouth the patient seemed to actively deviate her tongue to the right. The patient was asked to hold her arms in order to test drift and hold her arm up for a period of time before actively pushing it down towards the bed. The patient's left lower extremity does appear to have some mild weakness associated with it. Diagnostic Results     EKG   Normal sinus rhythm with a ventricular rate of 59 normal intervals normal axis    RADIOLOGY:  CT HEAD WO CONTRAST   Final Result      1. NO ACUTE INTRACRANIAL ABNORMALITY.          MRI BRAIN WO CONTRAST    (Results Pending)       LABS:   Results for orders placed or performed during the hospital encounter of 01/11/21   CBC Auto Differential   Result Value Ref Range    WBC 6.0 4.0 - 11.0 K/uL    RBC 4.77 4.00 - 5.20 M/uL    Hemoglobin 14.2 12.0 - 16.0 g/dL    Hematocrit 42.6 36.0 - 48.0 %    MCV 89.2 80.0 - 100.0 fL    MCH 29.7 26.0 - 34.0 pg    MCHC 33.3 31.0 - 36.0 g/dL    RDW 13.1 12.4 - 15.4 %    Platelets 286 462 - 045 K/uL    MPV 7.7 5.0 - 10.5 fL    Neutrophils % 53.2 %    Lymphocytes % 37.7 %    Monocytes % 7.8 %    Eosinophils % 0.5 %    Basophils % 0.8 %    Neutrophils Absolute 3.2 1.7 - 7.7 K/uL    Lymphocytes Absolute 2.2 1.0 - 5.1 K/uL    Monocytes Absolute 0.5 0.0 - 1.3 K/uL    Eosinophils Absolute 0.0 0.0 - 0.6 K/uL    Basophils Absolute 0.0 0.0 - 0.2 K/uL   Basic Metabolic Panel   Result Value Ref Range    Sodium 140 136 - 145 mmol/L    Potassium 3.7 3.5 - 5.1 mmol/L    Chloride 104 99 - 110 mmol/L    CO2 28 21 - 32 mmol/L    Anion Gap 8 3 - 16    Glucose 98 70 - 99 mg/dL    BUN 15 7 - 20 mg/dL    CREATININE 0.8 0.6 - 1.2 mg/dL    GFR Non-African American >60 >60    GFR African American >60 >60    Calcium 9.3 8.3 - 10.6 mg/dL   Troponin   Result Value Ref Range    Troponin <0.01 <0.01 ng/mL   Phosphorus   Result Value Ref Range    Phosphorus 3.6 2.5 - 4.9 mg/dL   Magnesium   Result Value Ref Range    Magnesium 2.10 1.80 - 2.40 mg/dL   POCT Glucose   Result Value Ref Range    POC Glucose 103 (H) 70 - 99 mg/dl    Performed on ACCU-CHEK    EKG 12 Lead   Result Value Ref Range    Ventricular Rate 59 BPM    Atrial Rate 59 BPM    P-R Interval 146 ms    QRS Duration 82 ms    Q-T Interval 434 ms    QTc Calculation (Bazett) 429 ms    P Axis 56 degrees    R Axis 16 degrees    T Axis 43 degrees    Diagnosis       EKG performed in ER and to be interpreted by ER physician. Confirmed by MD, ER ([id number]),  Varghese Branham ([id number]) on 1/11/2021 2:05:53 PM       RECENT VITALS:  BP: (!) 145/67, Temp: 98.3 °F (36.8 °C), Pulse: 60, Resp: 16, SpO2: 100 %     ED Course     Nursing Notes, Past Medical Hx, Past Surgical Hx, Social Hx, Allergies, and Family Hx were reviewed. The patient was given the following medications:  No orders of the defined types were placed in this encounter. CONSULTS:  IP CONSULT TO STROKE TEAM  IP CONSULT TO NEUROLOGY    MEDICAL DECISION MAKING / ASSESSMENT / Christya Cowden is a 77 y.o. female who presents to the emergency department the complaint of left upper and lower extremity weakness.   On initial examination I was concerned for the possibility of a stroke given the fact that she was 3 and half hours from the time of her stated onset of symptoms I did page this as a code stroke and spoke with the stroke team.  Patient was taken quickly to CT where she had a CT of the head. She was not able to receive a CT angio of the head and neck because she has a pre-existing contrast allergy. The patient CT of the head showed no evidence of any intracranial cranial hemorrhage or mass-effect. I spoke with the stroke team regarding the patient's symptoms and I reviewed the patient's old records. Her most recent admission where she was seen by neurology she was found to have left lower extremity weakness and was ultimately diagnosed as having some matization disorder after having a negative MRI and having what was largely described there is a functional physical exam.  Overall I feel that her physical exam here today also is somewhat functional although the left lower extremity weakness is somewhat more objective in nature. Neurology has been consulted. The patient has had laboratory investigations sent which showed a fingerstick blood sugar of 103 a CBC which was within normal limits. Basic metabolic profile troponin within normal limits. Magnesium and phosphorus within normal limits. At this point time the patient has an MRI of the brain pending. Neurology will reevaluate the patient at approximately 4 PM.  At this point time neurology feels patient will likely be able to be discharged home. If the MRI shows no evidence of stroke and patient is symptomatically improved I feel that she will be able to be discharged. Patient will be turned over to an oncoming provider who will follow up the remainder the patient's laboratory investigations. Clinical Impression     1. Left-sided weakness        Disposition     PATIENT REFERRED TO:  No follow-up provider specified.     DISCHARGE MEDICATIONS:  New Prescriptions    No medications on file       DISPOSITION           Shruthi Michelle MD  01/11/21 [address] [facility], [place] [address] [place], [place]  01/11/21

## 2021-01-11 NOTE — ED PROVIDER NOTES
Patient returned from MRI. Neurology team came down and told me patient could be discharged. MRI result is as follows  MRI BRAIN WO CONTRAST (Final result)  Result time 01/11/21 15:21:18  Final result by Aniceto Vega MD (01/11/21 15:21:18)                Impression:      1. Normal MRI of the brain. No acute intracranial abnormality. No evidence of acute ischemia.                Impression: fatigue    Plan discharged home follow-up with family physician and per Cristel Mills MD  01/11/21 [phone]

## 2021-06-02 ENCOUNTER — CLINICAL DOCUMENTATION (OUTPATIENT)
Dept: OTHER | Age: 66
End: 2021-06-02

## 2022-10-13 ENCOUNTER — OFFICE VISIT (OUTPATIENT)
Dept: CARDIOLOGY CLINIC | Age: 67
End: 2022-10-13

## 2022-10-13 ENCOUNTER — TELEPHONE (OUTPATIENT)
Dept: CARDIOLOGY CLINIC | Age: 67
End: 2022-10-13

## 2022-10-13 VITALS
SYSTOLIC BLOOD PRESSURE: 110 MMHG | DIASTOLIC BLOOD PRESSURE: 80 MMHG | HEART RATE: 64 BPM | WEIGHT: 167 LBS | BODY MASS INDEX: 27.79 KG/M2

## 2022-10-13 DIAGNOSIS — R00.2 PALPITATION: ICD-10-CM

## 2022-10-13 DIAGNOSIS — I10 ESSENTIAL HYPERTENSION: ICD-10-CM

## 2022-10-13 DIAGNOSIS — R00.0 TACHYCARDIA: Primary | ICD-10-CM

## 2022-10-13 PROCEDURE — G8400 PT W/DXA NO RESULTS DOC: HCPCS | Performed by: INTERNAL MEDICINE

## 2022-10-13 PROCEDURE — G8428 CUR MEDS NOT DOCUMENT: HCPCS | Performed by: INTERNAL MEDICINE

## 2022-10-13 PROCEDURE — 1090F PRES/ABSN URINE INCON ASSESS: CPT | Performed by: INTERNAL MEDICINE

## 2022-10-13 PROCEDURE — G8419 CALC BMI OUT NRM PARAM NOF/U: HCPCS | Performed by: INTERNAL MEDICINE

## 2022-10-13 PROCEDURE — 93000 ELECTROCARDIOGRAM COMPLETE: CPT | Performed by: INTERNAL MEDICINE

## 2022-10-13 PROCEDURE — 93228 REMOTE 30 DAY ECG REV/REPORT: CPT | Performed by: INTERNAL MEDICINE

## 2022-10-13 PROCEDURE — 1123F ACP DISCUSS/DSCN MKR DOCD: CPT | Performed by: INTERNAL MEDICINE

## 2022-10-13 PROCEDURE — 99204 OFFICE O/P NEW MOD 45 MIN: CPT | Performed by: INTERNAL MEDICINE

## 2022-10-13 PROCEDURE — 3017F COLORECTAL CA SCREEN DOC REV: CPT | Performed by: INTERNAL MEDICINE

## 2022-10-13 PROCEDURE — G8484 FLU IMMUNIZE NO ADMIN: HCPCS | Performed by: INTERNAL MEDICINE

## 2022-10-13 PROCEDURE — 1036F TOBACCO NON-USER: CPT | Performed by: INTERNAL MEDICINE

## 2022-10-13 ASSESSMENT — ENCOUNTER SYMPTOMS
SHORTNESS OF BREATH: 0
CHOKING: 0
CHEST TIGHTNESS: 0
COUGH: 0

## 2022-10-13 NOTE — PROGRESS NOTES
Subjective:      Patient ID: Amada Torres is a 69 y.o. female. HPI  Referred for tachycardia/palp. Also with HTN. Hx of SVT for years. Passed out with these. Hasnt had this for some time. Now HR fast then a hard beat then back to normal   also walking will heart will pause. Pause happens daily. Can pause several times. No recent syncope. When does any activities HR up. Up to 120-130. No pnd or orthopnea. Past Medical History:   Diagnosis Date    Arrhythmia     Asthma     Atrial fibrillation (HCC)     CAD (coronary artery disease)     Gastroparesis     Hypertension     PAF (paroxysmal atrial fibrillation) ([facility] 6/6/2018    Patient reported; no concrete evidence    Psychiatric disorder     Seizures ([facility]     Sick sinus syndrome (HCC)     Tachy-jazmin syndrome ([facility]     Unspecified cerebral artery occlusion with cerebral infarction      Past Surgical History:   Procedure Laterality Date    APPENDECTOMY      BREAST SURGERY      HYSTERECTOMY       Social History     Socioeconomic History    Marital status: Single     Spouse name: Not on file    Number of children: Not on file    Years of education: Not on file    Highest education level: Not on file   Occupational History    Not on file   Tobacco Use    Smoking status: Never    Smokeless tobacco: Never   Substance and Sexual Activity    Alcohol use: No    Drug use: No    Sexual activity: Never   Other Topics Concern    Not on file   Social History Narrative    Not on file     Social Determinants of Health     Financial Resource Strain: Not on file   Food Insecurity: Not on file   Transportation Needs: Not on file   Physical Activity: Not on file   Stress: Not on file   Social Connections: Not on file   Intimate Partner Violence: Not on file   Housing Stability: Not on file     FH reviewed    Vitals:    10/13/22 1013   BP: 110/80   Pulse: 64     Wt 167    Review of Systems   Constitutional:  Positive for fatigue.  Negative for activity change and appetite change. Sweats with any activity. Respiratory:  Negative for cough, choking, chest tightness and shortness of breath. Cardiovascular:  Positive for palpitations. Negative for chest pain and leg swelling. Denies PND or orthopnea. No tachycardia or syncope. Neurological:  Negative for dizziness, syncope and light-headedness. Psychiatric/Behavioral:  Negative for agitation, behavioral problems and confusion. Objective:   Physical Exam  Constitutional:       General: She is not in acute distress. Appearance: Normal appearance. She is well-developed. HENT:      Head: Normocephalic and atraumatic. Right Ear: External ear normal.      Left Ear: External ear normal.      Nose: Nose normal.   Neck:      Vascular: No JVD. Cardiovascular:      Rate and Rhythm: Normal rate and regular rhythm. Heart sounds: Normal heart sounds. No murmur heard. No gallop. Pulmonary:      Effort: Pulmonary effort is normal. No respiratory distress. Breath sounds: Normal breath sounds. No wheezing or rales. Abdominal:      General: Bowel sounds are normal.      Palpations: Abdomen is soft. Tenderness: There is no abdominal tenderness. Musculoskeletal:         General: Normal range of motion. Cervical back: Normal range of motion. Skin:     General: Skin is warm and dry. Neurological:      General: No focal deficit present. Mental Status: She is alert and oriented to person, place, and time. Psychiatric:         Behavior: Behavior normal.       Assessment:       Diagnosis Orders   1. Tachycardia        2. Palpitation        3. Essential hypertension                  Plan:      Describes multiple sx of tachy/skipped beats/hx svt. Seems anxious. EKG today shows NSR, NL axis, WNL. Continue HCTZ for HTN. Takes atenolol only as needed but not regularly due to lowering of bp. Had myoview/echo 3/20 which was ok. Had EP study 2007. Also Cath BN several yrs ago.  Edgar scor 0 6//20. Will have echo. 30 day monitor. Follow up after testing.          Dione Kapoor MD

## 2022-11-03 ENCOUNTER — APPOINTMENT (OUTPATIENT)
Dept: GENERAL RADIOLOGY | Age: 67
End: 2022-11-03
Payer: MEDICARE

## 2022-11-03 ENCOUNTER — HOSPITAL ENCOUNTER (EMERGENCY)
Age: 67
Discharge: HOME | End: 2022-11-03
Attending: EMERGENCY MEDICINE
Payer: MEDICARE

## 2022-11-03 ENCOUNTER — TELEPHONE (OUTPATIENT)
Dept: CARDIOLOGY CLINIC | Age: 67
End: 2022-11-03

## 2022-11-03 VITALS
BODY MASS INDEX: 27.96 KG/M2 | HEART RATE: 57 BPM | DIASTOLIC BLOOD PRESSURE: 55 MMHG | TEMPERATURE: 98.1 F | OXYGEN SATURATION: 96 % | RESPIRATION RATE: 12 BRPM | WEIGHT: 168 LBS | SYSTOLIC BLOOD PRESSURE: 110 MMHG

## 2022-11-03 DIAGNOSIS — R00.2 PALPITATIONS: Primary | ICD-10-CM

## 2022-11-03 LAB
A/G RATIO: 1.8 (ref 1.1–2.2)
ALBUMIN SERPL-MCNC: 4.5 G/DL (ref 3.4–5)
ALP BLD-CCNC: 46 U/L (ref 40–129)
ALT SERPL-CCNC: 17 U/L (ref 10–40)
ANION GAP SERPL CALCULATED.3IONS-SCNC: 9 MMOL/L (ref 3–16)
AST SERPL-CCNC: 17 U/L (ref 15–37)
BILIRUB SERPL-MCNC: <0.2 MG/DL (ref 0–1)
BUN BLDV-MCNC: 18 MG/DL (ref 7–20)
CALCIUM SERPL-MCNC: 9.6 MG/DL (ref 8.3–10.6)
CHLORIDE BLD-SCNC: 101 MMOL/L (ref 99–110)
CO2: 28 MMOL/L (ref 21–32)
CREAT SERPL-MCNC: 0.8 MG/DL (ref 0.6–1.2)
EKG ATRIAL RATE: 58 BPM
EKG DIAGNOSIS: NORMAL
EKG P AXIS: 67 DEGREES
EKG P-R INTERVAL: 150 MS
EKG Q-T INTERVAL: 426 MS
EKG QRS DURATION: 86 MS
EKG QTC CALCULATION (BAZETT): 418 MS
EKG R AXIS: 30 DEGREES
EKG T AXIS: 51 DEGREES
EKG VENTRICULAR RATE: 58 BPM
GFR SERPL CREATININE-BSD FRML MDRD: >60 ML/MIN/{1.73_M2}
GLUCOSE BLD-MCNC: 116 MG/DL (ref 70–99)
HCT VFR BLD CALC: 41.2 % (ref 36–48)
HEMOGLOBIN: 14.1 G/DL (ref 12–16)
MCH RBC QN AUTO: 29.9 PG (ref 26–34)
MCHC RBC AUTO-ENTMCNC: 34.1 G/DL (ref 31–36)
MCV RBC AUTO: 87.5 FL (ref 80–100)
PDW BLD-RTO: 13.3 % (ref 12.4–15.4)
PLATELET # BLD: 303 K/UL (ref 135–450)
PMV BLD AUTO: 7.6 FL (ref 5–10.5)
POTASSIUM REFLEX MAGNESIUM: 4 MMOL/L (ref 3.5–5.1)
RBC # BLD: 4.71 M/UL (ref 4–5.2)
SODIUM BLD-SCNC: 138 MMOL/L (ref 136–145)
TOTAL PROTEIN: 7 G/DL (ref 6.4–8.2)
TROPONIN: <0.01 NG/ML
WBC # BLD: 6.7 K/UL (ref 4–11)

## 2022-11-03 PROCEDURE — 93005 ELECTROCARDIOGRAM TRACING: CPT | Performed by: STUDENT IN AN ORGANIZED HEALTH CARE EDUCATION/TRAINING PROGRAM

## 2022-11-03 PROCEDURE — 99285 EMERGENCY DEPT VISIT HI MDM: CPT

## 2022-11-03 PROCEDURE — 80053 COMPREHEN METABOLIC PANEL: CPT

## 2022-11-03 PROCEDURE — 71045 X-RAY EXAM CHEST 1 VIEW: CPT

## 2022-11-03 PROCEDURE — 85027 COMPLETE CBC AUTOMATED: CPT

## 2022-11-03 PROCEDURE — 84484 ASSAY OF TROPONIN QUANT: CPT

## 2022-11-03 ASSESSMENT — PAIN - FUNCTIONAL ASSESSMENT: PAIN_FUNCTIONAL_ASSESSMENT: 0-10

## 2022-11-03 ASSESSMENT — PAIN DESCRIPTION - DESCRIPTORS: DESCRIPTORS: HEAVINESS

## 2022-11-03 ASSESSMENT — PAIN DESCRIPTION - LOCATION: LOCATION: CHEST

## 2022-11-03 ASSESSMENT — PAIN SCALES - GENERAL: PAINLEVEL_OUTOF10: 2

## 2022-11-03 NOTE — TELEPHONE ENCOUNTER
Spoke with pt, she states she has not been feeling well for the last 10 days and this is the first day she has gone out. She started having episodes of her heart being irregular, then pause. She states it usually comes and goes, but today it lasted over an hour. As I was talking to her she said that her heart starting doing it again. Due due her history of syncope during one of these episode, I advised her to call 911 and go to the ER.   Pt voiced an understanding and is going to call the squad

## 2022-11-03 NOTE — TELEPHONE ENCOUNTER
Pt. Stated she was having double heart beats and her heart beat would pause. Pt has been experiencing these symptoms for 1 hr.  Pt would like a call back at [phone] ASAP

## 2022-11-03 NOTE — ED PROVIDER NOTES
[id number]          EM RESIDENT NOTE       Date of evaluation: 11/3/2022    Chief Complaint     Chest Pain and Palpitations (Pt. Presents to ED with c/o palpitations and chest pressure that started around 0430 today and lasted a little over an hour. Patient has an external cardiac monitor in place and is being followed by cardiology. )      of Present Illness     Adonay Nichole is a 69 y.o. female who presents for concerns of palpitations. Patient is currently being worked up by her primary care doctor for these palpitations and is currently wearing an event monitor. She has a history of SVT in the past as this is more A. fib. The patient states earlier today another course today she had multiple episodes where she felt like her heart was racing and had some associated shortness of breath with it. The patient never lost consciousness or passed out. These episodes do not happen with exertion. The patient does not ingest any caffeine or alcohol. She does not smoke cigarettes. She is unsure of any aggravating or alleviating factors. Patient otherwise denies any headaches vision changes sore throat pain with breathing, shortness of breath when laying down, shortness of breath with walking, abdominal pain, nausea, vomiting, diarrhea, lower extremity swelling, rashes. Review of Systems     Review of Systems   Constitutional:         All other symptoms reviewed and otherwise negative except per HPI     Past Medical, Surgical, Family, and Social History     She has a past medical history of Arrhythmia, Asthma, Atrial fibrillation ([address] CAD (coronary artery disease), Gastroparesis, Hypertension, PAF (paroxysmal atrial fibrillation) ([address] Psychiatric disorder, Seizures ([address] Sick sinus syndrome ([address] Tachy-jazmin syndrome ([address] and Unspecified cerebral artery occlusion with cerebral infarction. She has a past surgical history that includes Appendectomy;  Hysterectomy; and Breast surgery. Her family history includes Alzheimer's Disease in her mother; Diabetes in her father and mother; High Blood Pressure in her mother. She reports that she has never smoked. She has never used smokeless tobacco. She reports that she does not drink alcohol and does not use drugs.     Medications     Discharge Medication List as of 11/3/2022  6:50 PM        CONTINUE these medications which have NOT CHANGED    Details   famotidine (PEPCID) 20 MG tablet Take 1 tablet by mouth 2 times daily, Disp-180 tablet,R-1Print      albuterol sulfate  (90 Base) MCG/ACT inhaler Inhale 2 puffs into the lungsHistorical Med      hydrochlorothiazide (HYDRODIURIL) 12.5 MG tablet Take 12.5 mg by mouth daily as needed Historical Med      Multiple Vitamin (MULTIVITAMIN) tablet Take 1 tablet by mouthHistorical Med      nystatin (MYCOSTATIN) 651508 UNIT/ML suspension 500,000 Units, Starting Wed 6/27/2018, Historical Med      acetaminophen (TYLENOL) 325 MG tablet Take 2 tablets by mouth every 6 hours as needed for Pain, Disp-120 tablet, R-3OTC      fluticasone (FLONASE) 50 MCG/ACT nasal spray 1 spray by Nasal route daily, Disp-1 Bottle, R-0Print      vitamin D (CHOLECALCIFEROL) 1000 UNIT TABS tablet Take 1,000 Units by mouth dailyHistorical Med      vitamin B-12 (CYANOCOBALAMIN) 100 MCG tablet Take 50 mcg by mouth dailyHistorical Med      pantoprazole (PROTONIX) 20 MG tablet Take 1 tablet by mouth 2 times daily, Disp-60 tablet, R-0Print      fluticasone (FLOVENT HFA) 220 MCG/ACT inhaler Inhale 1 puff into the lungs 2 times dailyHistorical Med      levothyroxine (SYNTHROID) 75 MCG tablet Take 75 mcg by mouth DailyHistorical Med      aspirin EC 81 MG EC tablet Take 81 mg by mouth daily      escitalopram (LEXAPRO) 10 MG tablet Take 5 mg by mouth nightly Historical Med      diazepam (VALIUM) 5 MG tablet Take 5 mg by mouth every 12 hours as needed Historical Med             Allergies     She is allergic to ciprofloxacin, phenytoin, demerol, iodides, penicillins, vancomycin, avelox [moxifloxacin hydrochloride], betamethasone, clarithromycin, dye [barium-containing compounds], gadolinium, hydrocodone, hydromorphone, ibuprofen, influenza a (h1n1) monoval vac, phenytoin sodium extended, pneumococcal vaccine, quinolones, shellfish-derived products, sulfa antibiotics, tetanus toxoids, alprazolam, codeine, meperidine, and morphine. Physical Exam     INITIAL VITALS: BP: (!) 147/90, Temp: 98.1 °F (36.7 °C), Heart Rate: 57, Resp: 14, SpO2: 99 %   Physical Exam  Vitals and nursing note reviewed. Constitutional:       General: She is not in acute distress. Appearance: Normal appearance. HENT:      Head: Normocephalic and atraumatic. Right Ear: External ear normal.      Left Ear: External ear normal.      Nose: Nose normal.      Mouth/Throat:      Mouth: Mucous membranes are moist.      Pharynx: No oropharyngeal exudate or posterior oropharyngeal erythema. Eyes:      General: No scleral icterus. Extraocular Movements: Extraocular movements intact. Pupils: Pupils are equal, round, and reactive to light. Cardiovascular:      Rate and Rhythm: Normal rate and regular rhythm. Pulses: Normal pulses. Heart sounds: Normal heart sounds. No murmur heard. Pulmonary:      Effort: Pulmonary effort is normal. No respiratory distress. Breath sounds: Normal breath sounds. No wheezing. Chest:      Chest wall: No tenderness. Abdominal:      General: There is no distension. Palpations: Abdomen is soft. Tenderness: There is no abdominal tenderness. There is no guarding. Musculoskeletal:         General: No swelling or tenderness. Normal range of motion. Cervical back: Normal range of motion. Skin:     General: Skin is warm. Capillary Refill: Capillary refill takes less than 2 seconds. Findings: No rash. Neurological:      General: No focal deficit present.       Mental Status: She is alert and oriented to person, place, and time. Mental status is at baseline. Motor: No weakness. Psychiatric:         Mood and Affect: Mood normal.       DiagnosticResults     EKG   Interpreted in conjunction with emergencydepartment physician Satinder Jarrett MD  Rate of 58, sinus rhythm, narrow complex, normal axis, early R wave progression, no ST segment or T wave changes concerning for acute ischemia, intervals appropriate with QTc 418, QRS 86, , no significant ectopy, no pathologic Q waves noted, consistent with sinus bradycardia. Unchanged from EKG dated 11-January-2021    RADIOLOGY:  XR CHEST PORTABLE   Final Result   1. No acute disease.           LABS:   Results for orders placed or performed during the hospital encounter of 11/03/22   CBC   Result Value Ref Range    WBC 6.7 4.0 - 11.0 K/uL    RBC 4.71 4.00 - 5.20 M/uL    Hemoglobin 14.1 12.0 - 16.0 g/dL    Hematocrit 41.2 36.0 - 48.0 %    MCV 87.5 80.0 - 100.0 fL    MCH 29.9 26.0 - 34.0 pg    MCHC 34.1 31.0 - 36.0 g/dL    RDW 13.3 12.4 - 15.4 %    Platelets 095 523 - 846 K/uL    MPV 7.6 5.0 - 10.5 fL   CMP w/ Reflex to MG   Result Value Ref Range    Sodium 138 136 - 145 mmol/L    Potassium reflex Magnesium 4.0 3.5 - 5.1 mmol/L    Chloride 101 99 - 110 mmol/L    CO2 28 21 - 32 mmol/L    Anion Gap 9 3 - 16    Glucose 116 (H) 70 - 99 mg/dL    BUN 18 7 - 20 mg/dL    Creatinine 0.8 0.6 - 1.2 mg/dL    Est, Glom Filt Rate >60 >60    Calcium 9.6 8.3 - 10.6 mg/dL    Total Protein 7.0 6.4 - 8.2 g/dL    Albumin 4.5 3.4 - 5.0 g/dL    Albumin/Globulin Ratio 1.8 1.1 - 2.2    Total Bilirubin <0.2 0.0 - 1.0 mg/dL    Alkaline Phosphatase 46 40 - 129 U/L    ALT 17 10 - 40 U/L    AST 17 15 - 37 U/L   Troponin   Result Value Ref Range    Troponin <0.01 <0.01 ng/mL   EKG 12 Lead   Result Value Ref Range    Ventricular Rate 58 BPM    Atrial Rate 58 BPM    P-R Interval 150 ms    QRS Duration 86 ms    Q-T Interval 426 ms    QTc Calculation (Bazett) 418 ms    P Axis 67 degrees    R Axis 30 degrees    T Axis 51 degrees    Diagnosis       EKG performed in ER and to be interpreted by ER physician. Confirmed by MD, ER ([id number]),  Dieudonne Khalil ([phone]) on 11/3/2022 7:47:24 PM       ED BEDSIDE ULTRASOUND:  No results found. RECENT VITALS:  BP: (!) 110/55, Temp: 98.1 °F (36.7 °C), Heart Rate: 57,Resp: 12, SpO2: 96 %     Procedures       ED Course     Nursing Notes, Past Medical Hx, Past Surgical Hx, Social Hx, Allergies, and Family Hx were reviewed. The patient was given the followingmedications:  No orders of the defined types were placed in this encounter. CONSULTS:  None    MEDICAL DECISION MAKING / ASSESSMENT / PLAN     Adithya Page is a 69 y.o. female 68-year-old female with history of SVT presenting for concerns of palpitations. Patient is currently wearing an event monitor. She had some shortness of breath associate with the episodes but no episodes of syncope. Patient's labs were obtained notable for an undetectable troponin, EKG there is reassuring, and otherwise reassuring electrolytes and CBC. Patient's vital signs also noted to be stable with asymptomatic bradycardia. The patient's event monitor had been present and the patient had triggered it during these episodes. I called the center which confirmed her triggers and stated the patient was in her sinus bradycardia for all these events but 1 which was a self-limited episode of SVT. The patient states that her symptoms were the same during all these periods. The patient has close follow-up with cardiology and will call them tomorrow to help schedule appointment to feel her strips and further titrate her medications. No concerns for other causes such as ischemia the patient has no other new sick symptoms patient was otherwise comfortable with this plan and return home with return precautions for any change in her symptoms were discussed. This patient was also evaluated by the attending physician. All care plans werediscussed and agreed upon. Clinical Impression     1. Palpitations        Disposition     PATIENT REFERRED TO:  No follow-up provider specified.     DISCHARGE MEDICATIONS:  Discharge Medication List as of 11/3/2022  6:50 PM          DISPOSITION Decision To Discharge 11/03/2022 06:45:49 PM       Karen Cat MD  Resident  11/04/22 [id number]

## 2022-11-03 NOTE — DISCHARGE INSTRUCTIONS
You are seen in the emergency department for your palpitations. Please follow-up with your cardiologist to evaluate these.

## 2022-11-03 NOTE — ED PROVIDER NOTES
ED Attending Attestation Note     Date of evaluation: 11/3/2022    This patient was seen by the resident. I have seen and examined the patient, agree with the workup, evaluation, management and diagnosis. The care plan has been discussed. I have reviewed the ECG and concur with the resident's interpretation. My assessment reveals well-appearing female in no acute distress with episodes of tachycardia and feeling like missed beats earlier today started around 230 she is well-appearing in no acute distress. Sinus rhythm with sinus bradycardia which is her baseline on the monitor with an occasional PAC. Here we reviewed the cardiac monitor that she has had she had a couple episodes of tachycardia which resolved on their own but were sinus tachycardia and episodes of sinus bradycardia but no other concerning rhythm.      Jenny Murcia MD  11/03/22 2104

## 2022-11-11 ENCOUNTER — HOSPITAL ENCOUNTER (OUTPATIENT)
Dept: NON INVASIVE DIAGNOSTICS | Age: 67
Discharge: HOME | End: 2022-11-11
Payer: MEDICARE

## 2022-11-11 DIAGNOSIS — R55 SYNCOPE, UNSPECIFIED SYNCOPE TYPE: ICD-10-CM

## 2022-11-11 DIAGNOSIS — R06.02 SOB (SHORTNESS OF BREATH): ICD-10-CM

## 2022-11-11 DIAGNOSIS — R00.0 TACHYCARDIA: Primary | ICD-10-CM

## 2022-11-11 LAB
LV EF: 63 %
LVEF MODALITY: NORMAL

## 2022-11-11 PROCEDURE — 93306 TTE W/DOPPLER COMPLETE: CPT

## 2022-11-21 ENCOUNTER — TELEPHONE (OUTPATIENT)
Dept: CARDIOLOGY CLINIC | Age: 67
End: 2022-11-21

## 2022-11-21 NOTE — TELEPHONE ENCOUNTER
Pt states that she would like her Echo results and also her monitor results please call [phone]

## 2022-11-22 NOTE — TELEPHONE ENCOUNTER
Reviewed results of echo and monitor with pt. Confirmed OV with DW on 11/30 to go over results more in depth.

## 2022-11-28 DIAGNOSIS — R00.2 PALPITATION: Primary | ICD-10-CM

## 2022-11-30 ENCOUNTER — OFFICE VISIT (OUTPATIENT)
Dept: CARDIOLOGY CLINIC | Age: 67
End: 2022-11-30
Payer: MEDICARE

## 2022-11-30 VITALS
HEART RATE: 68 BPM | DIASTOLIC BLOOD PRESSURE: 60 MMHG | WEIGHT: 166 LBS | BODY MASS INDEX: 27.62 KG/M2 | SYSTOLIC BLOOD PRESSURE: 110 MMHG

## 2022-11-30 DIAGNOSIS — I47.1 ATRIAL TACHYCARDIA (HCC): Primary | ICD-10-CM

## 2022-11-30 DIAGNOSIS — I10 ESSENTIAL HYPERTENSION: ICD-10-CM

## 2022-11-30 DIAGNOSIS — R00.2 PALPITATION: ICD-10-CM

## 2022-11-30 DIAGNOSIS — R00.0 TACHYCARDIA: ICD-10-CM

## 2022-11-30 PROCEDURE — 3078F DIAST BP <80 MM HG: CPT | Performed by: INTERNAL MEDICINE

## 2022-11-30 PROCEDURE — G8419 CALC BMI OUT NRM PARAM NOF/U: HCPCS | Performed by: INTERNAL MEDICINE

## 2022-11-30 PROCEDURE — G8484 FLU IMMUNIZE NO ADMIN: HCPCS | Performed by: INTERNAL MEDICINE

## 2022-11-30 PROCEDURE — 1090F PRES/ABSN URINE INCON ASSESS: CPT | Performed by: INTERNAL MEDICINE

## 2022-11-30 PROCEDURE — 1123F ACP DISCUSS/DSCN MKR DOCD: CPT | Performed by: INTERNAL MEDICINE

## 2022-11-30 PROCEDURE — 1036F TOBACCO NON-USER: CPT | Performed by: INTERNAL MEDICINE

## 2022-11-30 PROCEDURE — G8400 PT W/DXA NO RESULTS DOC: HCPCS | Performed by: INTERNAL MEDICINE

## 2022-11-30 PROCEDURE — G8427 DOCREV CUR MEDS BY ELIG CLIN: HCPCS | Performed by: INTERNAL MEDICINE

## 2022-11-30 PROCEDURE — 3017F COLORECTAL CA SCREEN DOC REV: CPT | Performed by: INTERNAL MEDICINE

## 2022-11-30 PROCEDURE — 99214 OFFICE O/P EST MOD 30 MIN: CPT | Performed by: INTERNAL MEDICINE

## 2022-11-30 PROCEDURE — 3074F SYST BP LT 130 MM HG: CPT | Performed by: INTERNAL MEDICINE

## 2022-11-30 ASSESSMENT — ENCOUNTER SYMPTOMS
SHORTNESS OF BREATH: 0
COUGH: 0
CHEST TIGHTNESS: 0
CHOKING: 0

## 2022-11-30 NOTE — PROGRESS NOTES
Subjective:      Patient ID: Ashok Nissen is a 69 y.o. female. [facility]  follow up for tachycardia/palp/atrial tachycardia. Also with HTN. Hx of SVT for years. Still having tachycardia. Daily. Watches caffeine. No recent syncope. When does any activities HR up. Up to 120-130. No pnd or orthopnea. No yncope. Past Medical History:   Diagnosis Date    Arrhythmia     Asthma     Atrial fibrillation (HCC)     CAD (coronary artery disease)     Gastroparesis     Hypertension     PAF (paroxysmal atrial fibrillation) ([facility] 6/6/2018    Patient reported; no concrete evidence    Psychiatric disorder     Seizures ([facility]     Sick sinus syndrome (HCC)     Tachy-jazmin syndrome ([facility]     Unspecified cerebral artery occlusion with cerebral infarction      Past Surgical History:   Procedure Laterality Date    APPENDECTOMY      BREAST SURGERY      HYSTERECTOMY (CERVIX STATUS UNKNOWN)       Social History     Socioeconomic History    Marital status: Single     Spouse name: Not on file    Number of children: Not on file    Years of education: Not on file    Highest education level: Not on file   Occupational History    Not on file   Tobacco Use    Smoking status: Never    Smokeless tobacco: Never   Substance and Sexual Activity    Alcohol use: No    Drug use: No    Sexual activity: Never   Other Topics Concern    Not on file   Social History Narrative    Not on file     Social Determinants of Health     Financial Resource Strain: Not on file   Food Insecurity: Not on file   Transportation Needs: Not on file   Physical Activity: Not on file   Stress: Not on file   Social Connections: Not on file   Intimate Partner Violence: Not on file   Housing Stability: Not on file      reviewed    Vitals:    11/30/22 1424   BP: 110/60   Pulse: 68       Wt 167    Review of Systems   Constitutional:  Positive for fatigue. Negative for activity change and appetite change. Sweats with any activity.    Respiratory:  Negative for cough, choking, chest tightness and shortness of breath. Cardiovascular:  Positive for palpitations. Negative for chest pain and leg swelling. Denies PND or orthopnea. No tachycardia or syncope. Neurological:  Negative for dizziness, syncope and light-headedness. Psychiatric/Behavioral:  Negative for agitation, behavioral problems and confusion. Objective:   Physical Exam  Constitutional:       General: She is not in acute distress. Appearance: Normal appearance. She is well-developed. HENT:      Head: Normocephalic and atraumatic. Right Ear: External ear normal.      Left Ear: External ear normal.      Nose: Nose normal.   Neck:      Vascular: No JVD. Cardiovascular:      Rate and Rhythm: Normal rate and regular rhythm. Heart sounds: Normal heart sounds. No murmur heard. No gallop. Pulmonary:      Effort: Pulmonary effort is normal. No respiratory distress. Breath sounds: Normal breath sounds. No wheezing or rales. Abdominal:      General: Bowel sounds are normal.      Palpations: Abdomen is soft. Tenderness: There is no abdominal tenderness. Musculoskeletal:         General: Normal range of motion. Cervical back: Normal range of motion. Skin:     General: Skin is warm and dry. Neurological:      General: No focal deficit present. Mental Status: She is alert and oriented to person, place, and time. Psychiatric:         Behavior: Behavior normal.       Assessment:       Diagnosis Orders   1. Atrial tachycardia (HCC)        2. Palpitation        3. Tachycardia        4. Essential hypertension                Plan:      Describes multiple sx of tachy/skipped beats/hx svt. Seems anxious. Continue HCTZ for HTN. Takes atenolol only as needed but not regularly due to lowering of HR. Will do atenolol 12.5 mg daily. Had myoview/echo 3/20 which was ok. Had EP study 2007. Also Cath BN several yrs ago. Edgar scor 0 6//20. Reviewed echo and 30 day monitor. Monitor shows bouts of atrial tachycardia.  Follow up 6-8 wks         Twila Pascal MD

## 2022-12-16 ENCOUNTER — TELEPHONE (OUTPATIENT)
Dept: CARDIOLOGY CLINIC | Age: 67
End: 2022-12-16

## 2022-12-16 NOTE — TELEPHONE ENCOUNTER
Patient called to report her portable EKG machine was showing an arrhythmia. She was not feeling well at this time. She decided to call 911 to have them check her rhythm. When they arrived and checked her rhythm-she was found to have tachycardia with PAC's. She did not wish to go to the ER. She is calling regarding what to do to control her HR/PAC's. She states that she cannot take Atenolol on a daily basis as it causes her heart rate and pulse to drop too low. She has been taking this as needed at this point-however would like an alternative that would be a better fit for her. Please advise.

## 2022-12-20 NOTE — TELEPHONE ENCOUNTER
Patient is calling back regarding the alternative medication that she can take other than the Atenolol.  [phone]

## 2023-01-12 NOTE — TELEPHONE ENCOUNTER
Requested Prescriptions     Pending Prescriptions Disp Refills    metoprolol tartrate (LOPRESSOR) 25 MG tablet [Pharmacy Med Name: METOPROLOL TARTRATE 25 MG TAB] 45 tablet 3     Sig: TAKE 1/2 TABLET BY MOUTH DAILY            Last Office Visit: 11/30/2022     Next Office Visit: Visit date not found       Last Labs: 11.03.2022

## 2023-03-01 ENCOUNTER — HOSPITAL ENCOUNTER (EMERGENCY)
Age: 68
Discharge: HOME | End: 2023-03-01

## 2023-03-01 VITALS
SYSTOLIC BLOOD PRESSURE: 144 MMHG | OXYGEN SATURATION: 98 % | HEART RATE: 70 BPM | HEIGHT: 65 IN | TEMPERATURE: 98.1 F | RESPIRATION RATE: 20 BRPM | WEIGHT: 155 LBS | BODY MASS INDEX: 25.83 KG/M2 | DIASTOLIC BLOOD PRESSURE: 114 MMHG

## 2023-03-01 ASSESSMENT — PAIN DESCRIPTION - PAIN TYPE: TYPE: ACUTE PAIN

## 2023-03-01 ASSESSMENT — PAIN SCALES - GENERAL: PAINLEVEL_OUTOF10: 8

## 2023-03-01 ASSESSMENT — PAIN DESCRIPTION - ORIENTATION: ORIENTATION: LEFT

## 2023-03-01 ASSESSMENT — PAIN DESCRIPTION - DESCRIPTORS: DESCRIPTORS: ACHING

## 2023-03-01 ASSESSMENT — PAIN - FUNCTIONAL ASSESSMENT: PAIN_FUNCTIONAL_ASSESSMENT: 0-10

## 2023-03-01 NOTE — ED TRIAGE NOTES
73y/o female presents to the ED with left hip and leg pain s/p fall occurred today. +left arm and also foot pain. 8/10. Pt states this was a mechanical fall. No obvious deformities. Pt states she was climbing onto a stool and fell back onto a vacuum . -n/v/f/c/d.

## 2023-07-30 ENCOUNTER — HOSPITAL ENCOUNTER (EMERGENCY)
Age: 68
Discharge: HOME | End: 2023-07-31
Attending: EMERGENCY MEDICINE
Payer: MEDICARE

## 2023-07-30 ENCOUNTER — APPOINTMENT (OUTPATIENT)
Dept: CT IMAGING | Age: 68
End: 2023-07-30
Payer: MEDICARE

## 2023-07-30 DIAGNOSIS — R56.9 SEIZURE-LIKE ACTIVITY (HCC): Primary | ICD-10-CM

## 2023-07-30 LAB
ANION GAP SERPL CALCULATED.3IONS-SCNC: 12 MMOL/L (ref 3–16)
BASE EXCESS BLDV CALC-SCNC: 2.3 MMOL/L (ref -2–3)
BASOPHILS # BLD: 0 K/UL (ref 0–0.2)
BASOPHILS NFR BLD: 0.9 %
BUN SERPL-MCNC: 16 MG/DL (ref 7–20)
CALCIUM SERPL-MCNC: 9.6 MG/DL (ref 8.3–10.6)
CHLORIDE SERPL-SCNC: 105 MMOL/L (ref 99–110)
CO2 BLDV-SCNC: 28 MMOL/L
CO2 SERPL-SCNC: 24 MMOL/L (ref 21–32)
COHGB MFR BLDV: 1.2 % (ref 0–1.5)
CREAT SERPL-MCNC: 0.9 MG/DL (ref 0.6–1.2)
DEPRECATED RDW RBC AUTO: 13.4 % (ref 12.4–15.4)
DO-HGB MFR BLDV: 32.9 %
EOSINOPHIL # BLD: 0 K/UL (ref 0–0.6)
EOSINOPHIL NFR BLD: 0.3 %
GFR SERPLBLD CREATININE-BSD FMLA CKD-EPI: >60 ML/MIN/{1.73_M2}
GLUCOSE BLD-MCNC: 95 MG/DL (ref 70–99)
GLUCOSE SERPL-MCNC: 100 MG/DL (ref 70–99)
HCO3 BLDV-SCNC: 26.9 MMOL/L (ref 24–28)
HCT VFR BLD AUTO: 42.1 % (ref 36–48)
HGB BLD-MCNC: 14.3 G/DL (ref 12–16)
LACTATE BLDV-SCNC: 2 MMOL/L (ref 0.4–2)
LYMPHOCYTES # BLD: 2 K/UL (ref 1–5.1)
LYMPHOCYTES NFR BLD: 35.8 %
MCH RBC QN AUTO: 29.7 PG (ref 26–34)
MCHC RBC AUTO-ENTMCNC: 34 G/DL (ref 31–36)
MCV RBC AUTO: 87.3 FL (ref 80–100)
METHGB MFR BLDV: 0 % (ref 0–1.5)
MONOCYTES # BLD: 0.4 K/UL (ref 0–1.3)
MONOCYTES NFR BLD: 7.3 %
NEUTROPHILS # BLD: 3 K/UL (ref 1.7–7.7)
NEUTROPHILS NFR BLD: 55.7 %
PCO2 BLDV: 40.7 MMHG (ref 41–51)
PERFORMED ON: NORMAL
PH BLDV: 7.43 [PH] (ref 7.35–7.45)
PLATELET # BLD AUTO: 277 K/UL (ref 135–450)
PMV BLD AUTO: 7.6 FL (ref 5–10.5)
PO2 BLDV: 33.3 MMHG (ref 25–40)
POTASSIUM SERPL-SCNC: 4.4 MMOL/L (ref 3.5–5.1)
RBC # BLD AUTO: 4.82 M/UL (ref 4–5.2)
SAO2 % BLDV: 67 %
SODIUM SERPL-SCNC: 141 MMOL/L (ref 136–145)
WBC # BLD AUTO: 5.5 K/UL (ref 4–11)

## 2023-07-30 PROCEDURE — 96374 THER/PROPH/DIAG INJ IV PUSH: CPT

## 2023-07-30 PROCEDURE — 6360000002 HC RX W HCPCS: Performed by: EMERGENCY MEDICINE

## 2023-07-30 PROCEDURE — 83605 ASSAY OF LACTIC ACID: CPT

## 2023-07-30 PROCEDURE — 99284 EMERGENCY DEPT VISIT MOD MDM: CPT

## 2023-07-30 PROCEDURE — 80048 BASIC METABOLIC PNL TOTAL CA: CPT

## 2023-07-30 PROCEDURE — 82803 BLOOD GASES ANY COMBINATION: CPT

## 2023-07-30 PROCEDURE — 85025 COMPLETE CBC W/AUTO DIFF WBC: CPT

## 2023-07-30 PROCEDURE — 70450 CT HEAD/BRAIN W/O DYE: CPT

## 2023-07-30 RX ORDER — LORAZEPAM 2 MG/ML
1 INJECTION INTRAMUSCULAR ONCE
Status: COMPLETED | OUTPATIENT
Start: 2023-07-30 | End: 2023-07-30

## 2023-07-30 RX ADMIN — LORAZEPAM 1 MG: 2 INJECTION INTRAMUSCULAR; INTRAVENOUS at 23:53

## 2023-07-30 ASSESSMENT — LIFESTYLE VARIABLES
HOW MANY STANDARD DRINKS CONTAINING ALCOHOL DO YOU HAVE ON A TYPICAL DAY: PATIENT DOES NOT DRINK
HOW OFTEN DO YOU HAVE A DRINK CONTAINING ALCOHOL: NEVER

## 2023-07-30 ASSESSMENT — PAIN SCALES - GENERAL: PAINLEVEL_OUTOF10: 0

## 2023-07-30 ASSESSMENT — PAIN - FUNCTIONAL ASSESSMENT: PAIN_FUNCTIONAL_ASSESSMENT: 0-10

## 2023-07-31 VITALS
DIASTOLIC BLOOD PRESSURE: 69 MMHG | HEART RATE: 52 BPM | HEIGHT: 65 IN | OXYGEN SATURATION: 100 % | SYSTOLIC BLOOD PRESSURE: 122 MMHG | RESPIRATION RATE: 13 BRPM | TEMPERATURE: 97.7 F | WEIGHT: 173.28 LBS | BODY MASS INDEX: 28.87 KG/M2

## 2023-07-31 LAB
BILIRUB UR QL STRIP.AUTO: NEGATIVE
CLARITY UR: CLEAR
COLOR UR: YELLOW
EPI CELLS #/AREA URNS HPF: ABNORMAL /HPF (ref 0–5)
GLUCOSE UR STRIP.AUTO-MCNC: NEGATIVE MG/DL
HGB UR QL STRIP.AUTO: NEGATIVE
KETONES UR STRIP.AUTO-MCNC: NEGATIVE MG/DL
LEUKOCYTE ESTERASE UR QL STRIP.AUTO: ABNORMAL
NITRITE UR QL STRIP.AUTO: NEGATIVE
PH UR STRIP.AUTO: 7.5 [PH] (ref 5–8)
PROT UR STRIP.AUTO-MCNC: NEGATIVE MG/DL
RBC #/AREA URNS HPF: ABNORMAL /HPF (ref 0–4)
SP GR UR STRIP.AUTO: 1.01 (ref 1–1.03)
UA DIPSTICK W REFLEX MICRO PNL UR: YES
URN SPEC COLLECT METH UR: ABNORMAL
UROBILINOGEN UR STRIP-ACNC: 0.2 E.U./DL
WBC #/AREA URNS HPF: ABNORMAL /HPF (ref 0–5)

## 2023-07-31 PROCEDURE — 81001 URINALYSIS AUTO W/SCOPE: CPT

## 2023-07-31 NOTE — ED NOTES
Patient prepared for and ready to be discharged. Dressed in clothes and given belongings. IV removed, pt tolerated well, no complications. Patient discharged at this time in no acute distress after pt verbalized understanding of discharge instructions. Reviewed medications, and when to return to the ED with patient. Encouraged follow up with Neuro  Patient wheeled to lobby, Family to take patient home.       Montse Macario RN  07/31/23 [id number]

## 2023-07-31 NOTE — DISCHARGE INSTRUCTIONS
As discussed, it will be very important to reestablish follow-up with neurology, so that an appropriate seizure control plan can be initiated. As you have had difficult interactions with several neurologist in the past, you may wish to call to try to follow-up with Dr. Lin Kelly, who is a very kind and empathetic epileptologist in the [address] [place] [address]. Please call her office as soon as possible to make a follow-up appointment. In the meantime, please call your primary care doctor's office to make a follow-up appointment. Call your doctor, or return to the emergency department, for worsening symptoms or other concerns.

## 2023-07-31 NOTE — ED PROVIDER NOTES
[facility]          ATTENDING PHYSICIAN NOTE       Date of evaluation: 7/30/2023    Chief Complaint     Seizures (Pt brought in for having 3x generalized seizure at home, had another episode en route that lasted 8 seconds, feels weak and tired. Had a stroke on 2019 and started having seizures after then.)      History of Present Illness     Destini Davila is a 77 y.o. female who presents to the emergency department after witnessed seizure-like activity at home. Patient carries a diagnosis of a seizure disorder, which she states began after a stroke in 2009. She describes that neurologists have given her differing opinions as to whether her seizures were epileptic or nonepileptic. She has been on several different antiepileptic medications in the past, and feels that her seizure activity was better controlled when she was on medication, but she has had allergies or intolerances to several seizure medications, including phenytoin and Keppra. The patient states that the most recent neurologist she was seen by told her that the seizures were nonepileptic, and took her off of medication, and she has not been taking any seizure medication for quite some time. She thought that the seizures \"were over\" because it had been several months since her last seizure episode. That occurred in April, at which time she was seen at [address] she felt an aura that told her that she was going to experience a seizure, and went out to the hallway to find a friend to help her. She was witnessed by her friend to have shaking activity, and EMS was called. According to EMS, the patient had an episode of seizure-like activity in route that lasted for 8 seconds. On arrival, the patient is awake and conversational, but states that she feels tired. She denies any recent illnesses or particular stressors. She denies any recent medication changes.     Patient notes that

## 2023-07-31 NOTE — ED TRIAGE NOTES
Pt brought in for having 3x generalized seizure at home, had another episode en route that lasted 8 seconds, feels weak and tired. Had a stroke on 2019 and started having seizures after then.

## 2023-08-09 ENCOUNTER — HOSPITAL ENCOUNTER (EMERGENCY)
Age: 68
Discharge: HOME | End: 2023-08-09
Attending: STUDENT IN AN ORGANIZED HEALTH CARE EDUCATION/TRAINING PROGRAM
Payer: MEDICARE

## 2023-08-09 VITALS
SYSTOLIC BLOOD PRESSURE: 134 MMHG | DIASTOLIC BLOOD PRESSURE: 67 MMHG | RESPIRATION RATE: 14 BRPM | WEIGHT: 178.5 LBS | HEART RATE: 66 BPM | OXYGEN SATURATION: 98 % | TEMPERATURE: 98.3 F | HEIGHT: 65 IN | BODY MASS INDEX: 29.74 KG/M2

## 2023-08-09 DIAGNOSIS — F44.5 PSYCHIATRIC PSEUDOSEIZURE: ICD-10-CM

## 2023-08-09 DIAGNOSIS — R56.9 CONVULSIONS, UNSPECIFIED CONVULSION TYPE (HCC): Primary | ICD-10-CM

## 2023-08-09 PROCEDURE — 99283 EMERGENCY DEPT VISIT LOW MDM: CPT
